# Patient Record
Sex: FEMALE | Race: WHITE | HISPANIC OR LATINO | URBAN - METROPOLITAN AREA
[De-identification: names, ages, dates, MRNs, and addresses within clinical notes are randomized per-mention and may not be internally consistent; named-entity substitution may affect disease eponyms.]

---
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
## 2021-02-21 ENCOUNTER — TRANSCRIPTION ENCOUNTER (OUTPATIENT)
Age: 21
End: 2021-02-21

## 2022-01-14 ENCOUNTER — APPOINTMENT (OUTPATIENT)
Dept: OBGYN | Facility: CLINIC | Age: 22
End: 2022-01-14
Payer: COMMERCIAL

## 2022-01-14 VITALS
HEIGHT: 63 IN | DIASTOLIC BLOOD PRESSURE: 70 MMHG | WEIGHT: 138 LBS | BODY MASS INDEX: 24.45 KG/M2 | SYSTOLIC BLOOD PRESSURE: 104 MMHG

## 2022-01-14 DIAGNOSIS — Z82.69 FAMILY HISTORY OF OTHER DISEASES OF THE MUSCULOSKELETAL SYSTEM AND CONNECTIVE TISSUE: ICD-10-CM

## 2022-01-14 DIAGNOSIS — Z80.0 FAMILY HISTORY OF MALIGNANT NEOPLASM OF DIGESTIVE ORGANS: ICD-10-CM

## 2022-01-14 DIAGNOSIS — N92.6 IRREGULAR MENSTRUATION, UNSPECIFIED: ICD-10-CM

## 2022-01-14 DIAGNOSIS — Z01.419 ENCOUNTER FOR GYNECOLOGICAL EXAMINATION (GENERAL) (ROUTINE) W/OUT ABNORMAL FINDINGS: ICD-10-CM

## 2022-01-14 DIAGNOSIS — Z78.9 OTHER SPECIFIED HEALTH STATUS: ICD-10-CM

## 2022-01-14 LAB
BASOPHILS # BLD AUTO: 0.06 K/UL
BASOPHILS NFR BLD AUTO: 0.9 %
EOSINOPHIL # BLD AUTO: 0.09 K/UL
EOSINOPHIL NFR BLD AUTO: 1.4 %
HCG UR QL: NEGATIVE
HCT VFR BLD CALC: 40.5 %
HGB BLD-MCNC: 13.2 G/DL
IMM GRANULOCYTES NFR BLD AUTO: 0.3 %
LYMPHOCYTES # BLD AUTO: 2.13 K/UL
LYMPHOCYTES NFR BLD AUTO: 33.3 %
MAN DIFF?: NORMAL
MCHC RBC-ENTMCNC: 29.9 PG
MCHC RBC-ENTMCNC: 32.6 GM/DL
MCV RBC AUTO: 91.8 FL
MONOCYTES # BLD AUTO: 0.5 K/UL
MONOCYTES NFR BLD AUTO: 7.8 %
NEUTROPHILS # BLD AUTO: 3.6 K/UL
NEUTROPHILS NFR BLD AUTO: 56.3 %
PLATELET # BLD AUTO: 314 K/UL
RBC # BLD: 4.41 M/UL
RBC # FLD: 12.9 %
WBC # FLD AUTO: 6.4 K/UL

## 2022-01-14 PROCEDURE — 81025 URINE PREGNANCY TEST: CPT

## 2022-01-14 PROCEDURE — 99395 PREV VISIT EST AGE 18-39: CPT

## 2022-01-14 NOTE — PHYSICAL EXAM

## 2022-01-14 NOTE — HISTORY OF PRESENT ILLNESS
[Previously active] : previously active [Condoms] : Condoms [PGHxTotal] : 0 [FreeTextEntry3] : 2 PARTNERS OVER LIFETIME

## 2022-01-19 LAB
C TRACH RRNA SPEC QL NAA+PROBE: NOT DETECTED
ESTIMATED AVERAGE GLUCOSE: 103 MG/DL
FERRITIN SERPL-MCNC: 16 NG/ML
HBA1C MFR BLD HPLC: 5.2 %
HIV1+2 AB SPEC QL IA.RAPID: NONREACTIVE
N GONORRHOEA RRNA SPEC QL NAA+PROBE: NOT DETECTED
SOURCE AMPLIFICATION: NORMAL
T PALLIDUM AB SER QL IA: NEGATIVE
TESTOST FREE SERPL-MCNC: 2 PG/ML
TESTOST SERPL-MCNC: 37.5 NG/DL
TSH SERPL-ACNC: 1.27 UIU/ML

## 2022-01-24 ENCOUNTER — TRANSCRIPTION ENCOUNTER (OUTPATIENT)
Age: 22
End: 2022-01-24

## 2022-01-24 ENCOUNTER — APPOINTMENT (OUTPATIENT)
Age: 22
End: 2022-01-24
Payer: COMMERCIAL

## 2022-01-24 ENCOUNTER — INPATIENT (INPATIENT)
Facility: HOSPITAL | Age: 22
LOS: 1 days | Discharge: HOME | DRG: 494 | End: 2022-01-26
Attending: ORTHOPAEDIC SURGERY | Admitting: ORTHOPAEDIC SURGERY
Payer: COMMERCIAL

## 2022-01-24 ENCOUNTER — NON-APPOINTMENT (OUTPATIENT)
Age: 22
End: 2022-01-24

## 2022-01-24 VITALS — HEIGHT: 63 IN | WEIGHT: 132.06 LBS

## 2022-01-24 DIAGNOSIS — S82.899A OTHER FRACTURE OF UNSPECIFIED LOWER LEG, INITIAL ENCOUNTER FOR CLOSED FRACTURE: ICD-10-CM

## 2022-01-24 LAB
ALBUMIN SERPL ELPH-MCNC: 3.7 G/DL — SIGNIFICANT CHANGE UP (ref 3.3–5)
ALP SERPL-CCNC: 83 U/L — SIGNIFICANT CHANGE UP (ref 40–120)
ALT FLD-CCNC: 27 U/L — SIGNIFICANT CHANGE UP (ref 12–78)
ANION GAP SERPL CALC-SCNC: 4 MMOL/L — LOW (ref 5–17)
APTT BLD: 29.9 SEC — SIGNIFICANT CHANGE UP (ref 27.5–35.5)
AST SERPL-CCNC: 38 U/L — HIGH (ref 15–37)
BASOPHILS # BLD AUTO: 0.07 K/UL — SIGNIFICANT CHANGE UP (ref 0–0.2)
BASOPHILS NFR BLD AUTO: 0.9 % — SIGNIFICANT CHANGE UP (ref 0–2)
BILIRUB SERPL-MCNC: 0.3 MG/DL — SIGNIFICANT CHANGE UP (ref 0.2–1.2)
BUN SERPL-MCNC: 9 MG/DL — SIGNIFICANT CHANGE UP (ref 7–23)
CALCIUM SERPL-MCNC: 9.4 MG/DL — SIGNIFICANT CHANGE UP (ref 8.5–10.1)
CHLORIDE SERPL-SCNC: 108 MMOL/L — SIGNIFICANT CHANGE UP (ref 96–108)
CO2 SERPL-SCNC: 29 MMOL/L — SIGNIFICANT CHANGE UP (ref 22–31)
CREAT SERPL-MCNC: 0.69 MG/DL — SIGNIFICANT CHANGE UP (ref 0.5–1.3)
EOSINOPHIL # BLD AUTO: 0.12 K/UL — SIGNIFICANT CHANGE UP (ref 0–0.5)
EOSINOPHIL NFR BLD AUTO: 1.6 % — SIGNIFICANT CHANGE UP (ref 0–6)
GLUCOSE SERPL-MCNC: 88 MG/DL — SIGNIFICANT CHANGE UP (ref 70–99)
HCG SERPL-ACNC: <1 MIU/ML — SIGNIFICANT CHANGE UP
HCT VFR BLD CALC: 39.3 % — SIGNIFICANT CHANGE UP (ref 34.5–45)
HGB BLD-MCNC: 12.7 G/DL — SIGNIFICANT CHANGE UP (ref 11.5–15.5)
IMM GRANULOCYTES NFR BLD AUTO: 0.3 % — SIGNIFICANT CHANGE UP (ref 0–1.5)
INR BLD: 1 RATIO — SIGNIFICANT CHANGE UP (ref 0.88–1.16)
LYMPHOCYTES # BLD AUTO: 2.38 K/UL — SIGNIFICANT CHANGE UP (ref 1–3.3)
LYMPHOCYTES # BLD AUTO: 31.2 % — SIGNIFICANT CHANGE UP (ref 13–44)
MCHC RBC-ENTMCNC: 30 PG — SIGNIFICANT CHANGE UP (ref 27–34)
MCHC RBC-ENTMCNC: 32.3 GM/DL — SIGNIFICANT CHANGE UP (ref 32–36)
MCV RBC AUTO: 92.7 FL — SIGNIFICANT CHANGE UP (ref 80–100)
MONOCYTES # BLD AUTO: 0.67 K/UL — SIGNIFICANT CHANGE UP (ref 0–0.9)
MONOCYTES NFR BLD AUTO: 8.8 % — SIGNIFICANT CHANGE UP (ref 2–14)
NEUTROPHILS # BLD AUTO: 4.37 K/UL — SIGNIFICANT CHANGE UP (ref 1.8–7.4)
NEUTROPHILS NFR BLD AUTO: 57.2 % — SIGNIFICANT CHANGE UP (ref 43–77)
PLATELET # BLD AUTO: 261 K/UL — SIGNIFICANT CHANGE UP (ref 150–400)
POTASSIUM SERPL-MCNC: 3.6 MMOL/L — SIGNIFICANT CHANGE UP (ref 3.5–5.3)
POTASSIUM SERPL-SCNC: 3.6 MMOL/L — SIGNIFICANT CHANGE UP (ref 3.5–5.3)
PROT SERPL-MCNC: 8.4 GM/DL — HIGH (ref 6–8.3)
PROTHROM AB SERPL-ACNC: 11.6 SEC — SIGNIFICANT CHANGE UP (ref 10.6–13.6)
RBC # BLD: 4.24 M/UL — SIGNIFICANT CHANGE UP (ref 3.8–5.2)
RBC # FLD: 12.6 % — SIGNIFICANT CHANGE UP (ref 10.3–14.5)
SARS-COV-2 RNA SPEC QL NAA+PROBE: SIGNIFICANT CHANGE UP
SODIUM SERPL-SCNC: 141 MMOL/L — SIGNIFICANT CHANGE UP (ref 135–145)
WBC # BLD: 7.63 K/UL — SIGNIFICANT CHANGE UP (ref 3.8–10.5)
WBC # FLD AUTO: 7.63 K/UL — SIGNIFICANT CHANGE UP (ref 3.8–10.5)

## 2022-01-24 PROCEDURE — 73610 X-RAY EXAM OF ANKLE: CPT | Mod: RT

## 2022-01-24 PROCEDURE — 76376 3D RENDER W/INTRP POSTPROCES: CPT | Mod: 26

## 2022-01-24 PROCEDURE — 80048 BASIC METABOLIC PNL TOTAL CA: CPT

## 2022-01-24 PROCEDURE — 73610 X-RAY EXAM OF ANKLE: CPT | Mod: 26,RT

## 2022-01-24 PROCEDURE — 73590 X-RAY EXAM OF LOWER LEG: CPT | Mod: 26,RT

## 2022-01-24 PROCEDURE — 76000 FLUOROSCOPY <1 HR PHYS/QHP: CPT

## 2022-01-24 PROCEDURE — 85027 COMPLETE CBC AUTOMATED: CPT

## 2022-01-24 PROCEDURE — 85610 PROTHROMBIN TIME: CPT

## 2022-01-24 PROCEDURE — 86900 BLOOD TYPING SEROLOGIC ABO: CPT

## 2022-01-24 PROCEDURE — 76376 3D RENDER W/INTRP POSTPROCES: CPT

## 2022-01-24 PROCEDURE — 97116 GAIT TRAINING THERAPY: CPT | Mod: GP

## 2022-01-24 PROCEDURE — 36415 COLL VENOUS BLD VENIPUNCTURE: CPT

## 2022-01-24 PROCEDURE — 87635 SARS-COV-2 COVID-19 AMP PRB: CPT

## 2022-01-24 PROCEDURE — 99222 1ST HOSP IP/OBS MODERATE 55: CPT

## 2022-01-24 PROCEDURE — 97162 PT EVAL MOD COMPLEX 30 MIN: CPT | Mod: GP

## 2022-01-24 PROCEDURE — 73700 CT LOWER EXTREMITY W/O DYE: CPT | Mod: RT

## 2022-01-24 PROCEDURE — 85730 THROMBOPLASTIN TIME PARTIAL: CPT

## 2022-01-24 PROCEDURE — 73590 X-RAY EXAM OF LOWER LEG: CPT | Mod: RT

## 2022-01-24 PROCEDURE — 86901 BLOOD TYPING SEROLOGIC RH(D): CPT

## 2022-01-24 PROCEDURE — 80053 COMPREHEN METABOLIC PANEL: CPT

## 2022-01-24 PROCEDURE — C1713: CPT

## 2022-01-24 PROCEDURE — 81025 URINE PREGNANCY TEST: CPT

## 2022-01-24 PROCEDURE — 86850 RBC ANTIBODY SCREEN: CPT

## 2022-01-24 PROCEDURE — 99204 OFFICE O/P NEW MOD 45 MIN: CPT

## 2022-01-24 PROCEDURE — 73700 CT LOWER EXTREMITY W/O DYE: CPT | Mod: 26,RT

## 2022-01-24 PROCEDURE — 99285 EMERGENCY DEPT VISIT HI MDM: CPT

## 2022-01-24 PROCEDURE — 84702 CHORIONIC GONADOTROPIN TEST: CPT

## 2022-01-24 PROCEDURE — 85025 COMPLETE CBC W/AUTO DIFF WBC: CPT

## 2022-01-24 RX ORDER — SODIUM CHLORIDE 9 MG/ML
1000 INJECTION, SOLUTION INTRAVENOUS
Refills: 0 | Status: DISCONTINUED | OUTPATIENT
Start: 2022-01-24 | End: 2022-01-25

## 2022-01-24 RX ORDER — OXYCODONE HYDROCHLORIDE 5 MG/1
5 TABLET ORAL EVERY 4 HOURS
Refills: 0 | Status: DISCONTINUED | OUTPATIENT
Start: 2022-01-24 | End: 2022-01-25

## 2022-01-24 RX ORDER — HEPARIN SODIUM 5000 [USP'U]/ML
5000 INJECTION INTRAVENOUS; SUBCUTANEOUS ONCE
Refills: 0 | Status: COMPLETED | OUTPATIENT
Start: 2022-01-24 | End: 2022-01-24

## 2022-01-24 RX ORDER — ACETAMINOPHEN 500 MG
975 TABLET ORAL EVERY 6 HOURS
Refills: 0 | Status: DISCONTINUED | OUTPATIENT
Start: 2022-01-24 | End: 2022-01-25

## 2022-01-24 RX ORDER — TRAMADOL HYDROCHLORIDE 50 MG/1
50 TABLET ORAL EVERY 4 HOURS
Refills: 0 | Status: DISCONTINUED | OUTPATIENT
Start: 2022-01-24 | End: 2022-01-24

## 2022-01-24 RX ORDER — TRAMADOL HYDROCHLORIDE 50 MG/1
50 TABLET ORAL EVERY 6 HOURS
Refills: 0 | Status: DISCONTINUED | OUTPATIENT
Start: 2022-01-24 | End: 2022-01-25

## 2022-01-24 RX ORDER — ONDANSETRON 8 MG/1
4 TABLET, FILM COATED ORAL EVERY 6 HOURS
Refills: 0 | Status: DISCONTINUED | OUTPATIENT
Start: 2022-01-24 | End: 2022-01-25

## 2022-01-24 RX ADMIN — SODIUM CHLORIDE 75 MILLILITER(S): 9 INJECTION, SOLUTION INTRAVENOUS at 23:51

## 2022-01-24 RX ADMIN — Medication 975 MILLIGRAM(S): at 18:56

## 2022-01-24 RX ADMIN — HEPARIN SODIUM 5000 UNIT(S): 5000 INJECTION INTRAVENOUS; SUBCUTANEOUS at 18:56

## 2022-01-24 NOTE — ED PROVIDER NOTE - OBJECTIVE STATEMENT
21 year old female with no pertinent PMH presents with right ankle pain s/p slip and fall 2 days ago. Pt seen by Dr. San and found to have multiple ankle fractures, sent in for admission for operative repair. Denies any fevers, chest pain, shortness of breath, abdominal pain, vomiting, diarrhea, headache, vision change, numbness, weakness, or rash. Denies any additional complaints.

## 2022-01-24 NOTE — ED PROVIDER NOTE - PHYSICAL EXAMINATION
TO BE COMPLETED CONSTITUTIONAL: non-toxic, well appearing  SKIN: no rash, no petechiae.  EYES: pink conjunctiva, anicteric  NECK: Supple; no meningismus, no JVD  CARD: RRR, no murmurs, equal radial pulses bilaterally 2+  RESP: CTAB, no respiratory distress  ABD: Soft, non-tender, non-distended, no peritoneal signs  EXT: Normal ROM x4. No edema. RLE splint in place, FROM of toes, warm, dry, well perfused.   NEURO: Alert, oriented. Neuro exam nonfocal.  PSYCH: Cooperative, appropriate.

## 2022-01-24 NOTE — PHYSICAL EXAM
[de-identified] : Right ankle Physical Examination:\par \par General: Alert and oriented x3.  In no acute distress.  Pleasant in nature with a normal affect.  No apparent respiratory distress. \par Erythema, Warmth, Rubor: Negative\par Swelling: Positive\par \par Range of motion and special tests were not performed today in the ankle due to the trimalleolar fracture.  Skin is intact with no blistering.\par \par Pulses: 2+ DP/PT Pulses\par \par Neuro: Intact motor and sensory [de-identified] : 3V of the right ankle were ordered, obtained, and reviewed by me today, 01/24/2022, revealed: Trimalleolar fracture. Subluxation noted. \par

## 2022-01-24 NOTE — ED ADULT NURSE NOTE - CHIEF COMPLAINT QUOTE
Pt presents to ED for right ankle sx with Dr. San. Pt reports ankle fx, confirmed on xray on saturday.   [facility]- [phone]

## 2022-01-24 NOTE — PHARMACOTHERAPY INTERVENTION NOTE - COMMENTS
Medication reconciliation completed.  Reviewed Medication list and confirmed med allergies with patient; confirmed with Dr. First Medfer.

## 2022-01-24 NOTE — ED ADULT TRIAGE NOTE - CHIEF COMPLAINT QUOTE
Pt presents to ED for right ankle sx with Dr. San. Pt reports ankle fx, confirmed on xray on saturday.   [facility]- [phone]

## 2022-01-24 NOTE — H&P ADULT - NSHPPHYSICALEXAM_GEN_ALL_CORE
Exam:  NAD AAOx3 Well nourished  Head: NC AT, PEERL  Neck: FAROM supple  Pulse: Regular  Lungs: Breathing nonlabored  Abdomen: Soft NT  LE: No edema  Musculoskeletal:  right ankle in splint  able to wiggle toes Exam:  NAD AAOx3 Well nourished  Head: NC AT, PEERL  Neck: FAROM supple  Pulse: Regular  Lungs: Breathing nonlabored  Abdomen: Soft NT  LE: No edema  Musculoskeletal:  right ankle in trilam plaster splint  able to wiggle toes  We took down part of padding to assess skin. SKin noted to be swollen but no blistering or ecchymosis  Toes with good sensation distally and warm

## 2022-01-24 NOTE — HISTORY OF PRESENT ILLNESS
[FreeTextEntry1] : 1/24/2022: The patient is a 21-year-old female who presents her mom today in the office for evaluation of her right ankle fracture.  This past Friday, she slipped and fell on ice accidentally, fracturing and dislocating the ankle. She then went to the hospital after the injury where they took x-rays and placed her in a splint, she is nonweightbearing with crutches today. As per the mother, she states that the patient was splinted two times while in the ED. She denies fevers, chills, night sweats, shortness of breath.  Her pain scale in the ankle today is a 9 out of 10. She is currently on pain medications as prescribed by the Hospital as needed. She is not currently on Birth Control. Of note, the patient is a current student at [employer]. She has no other complaints.

## 2022-01-24 NOTE — PATIENT PROFILE ADULT - FALL HARM RISK - HARM RISK INTERVENTIONS

## 2022-01-24 NOTE — H&P ADULT - HISTORY OF PRESENT ILLNESS
pt w ankle fx sent in by DR San   pt w ankle fx sent in by DR San for surgery. She had slipped on Ice on Saturday and was reduced in ED at [facility] near her [employer] which she says took 3 attepmts to do. She followed up with DR San in the office today and was sent in for plan for surgery tomorrow. She has no other complaints and there was no other injury. She is accompanied by her mom today in the ED.   Healthy 21F pt w ankle fx sent in by DR San for surgery. She had slipped on Ice on Saturday and was reduced in ED at [facility] near her [employer] which she says took 3 attepmts to do. She followed up with DR San in the office today and was sent in for plan for surgery tomorrow. She has no other complaints and there was no other injury. She is accompanied by her mom today in the ED. SHe takes no medications. Has been elevating her ankle at home since and has not walked on it.

## 2022-01-24 NOTE — DISCUSSION/SUMMARY
[de-identified] : Today I had a lengthy discussion with the patient regarding their right ankle, Trimalleolar fracture. I have addressed all the patient's concerns surrounding the pathology of their condition. XR imaging was completed in office today and results were reviewed with the patient. A lengthy discussion was had about the surgery, ORIF of the right ankle. All risks, benefits and alternatives to the recommended surgical procedure were discussed which include but are not limited to bleeding, infection, nerve damage, vascular damage, failure of the wound to heal, the need for further surgery, loss of limb, DVT, PE, loss of life as well as the risks associated with general anesthesia. The patient will need medical clearance prior to surgery, including but not limited to a COVID-19 PCR Test. The patient verbalized understanding and provided informed consent to move forward with surgery.		\par \par In the interim, the patient will remain nonweightbearing in the protective splint applied in the ED. I recommended that the patient and her mother travel to [place] ED today for possible admittance for surgery tomorrow and for further manipulation in the splint. We discussed the utilization of a blood thinner while in the ED for DVT Prophylaxis. A prescription for Aspirin was provided for the patient in the office today. She will continue to utilize the crutches for ambulation assistance PRN. The patient understood and verbally agreed to the treatment plan. All of their questions were answered and they were satisfied with the visit. The patient should call the office if they have any questions or experience worsening symptoms.\par \par The patient is a current student at [employer]. We had a discussion with regards to class excusal and for accommodations with regards to the patient's course schedule and living situations.

## 2022-01-24 NOTE — H&P ADULT - ASSESSMENT
Admit w plan for ORIF tomorrow 1/25 w DR Campo  xrays  Npo after midnight   DVT PE ppx  NWB  Elevation Admit w plan for ORIF tomorrow 1/25 w DR San  Ankle was elevated using blankets. Needs to maintain 3 pillow elevation at all times  Large Ice bag was draped over ankle was well  xrays done  Npo after midnight   DVT PE ppx Heparin SC  NWB  Pt signed consent for ORIF as well as possible Ex-fix if too swollen that was discussed w Mom at the bedside.  Above as discussed/directed by Dr San

## 2022-01-24 NOTE — ED PROVIDER NOTE - NS ED ROS FT
Review of Systems    Constitutional: (-) fever, (-) chills, (-) fatigue  HEENT: (-) sore throat, (-) hearing loss, (-) nasal congestion  Cardiovascular: (-) chest pain, (-) syncope  Respiratory: (-) cough, (-) shortness of breath  Gastrointestinal: (-) vomiting, (-) diarrhea, (-) abdominal pain  Musculoskeletal: (-) neck pain, (-) back pain, (+) ankle pain  Integumentary: (-) rash, (-) edema, (-) wound  Neurological: (-) headache, (-) altered mental status    Except as documented in the HPI, all other systems are negative.

## 2022-01-24 NOTE — H&P ADULT - ATTENDING COMMENTS
Pt seen and examined. Chart reviewed. The patient was sent in to the ED after seeing me in the office for definitive surgical management. The patient had a fall with a right ankle trimalleolar fracture with concern for soft tissue compromise and subluxated position in the splint after 3 reduction attempts at the outside facility. Surgical indications met. All RBA discussed at length. Postop recovery plan reviewed with the patient. Agree to move forward with the surgical plan.

## 2022-01-24 NOTE — ADDENDUM
[FreeTextEntry1] : I, Arabella Lay, acted solely as a scribe for Dr. Naseem San on this date 01/24/2022.\par \par All medical record entries made by the Scribe were at my, Dr. Naseem San, direction and personally dictated by me on 01/24/2022 . I have reviewed the chart and agree that the record accurately reflects my personal performance of the history, physical exam, assessment and plan. I have also personally directed, reviewed, and agreed with the chart.	\par

## 2022-01-25 LAB
ANION GAP SERPL CALC-SCNC: 4 MMOL/L — LOW (ref 5–17)
ANION GAP SERPL CALC-SCNC: 6 MMOL/L — SIGNIFICANT CHANGE UP (ref 5–17)
APTT BLD: 29.2 SEC — SIGNIFICANT CHANGE UP (ref 27.5–35.5)
BUN SERPL-MCNC: 7 MG/DL — SIGNIFICANT CHANGE UP (ref 7–23)
BUN SERPL-MCNC: 7 MG/DL — SIGNIFICANT CHANGE UP (ref 7–23)
CALCIUM SERPL-MCNC: 9 MG/DL — SIGNIFICANT CHANGE UP (ref 8.5–10.1)
CALCIUM SERPL-MCNC: 9.1 MG/DL — SIGNIFICANT CHANGE UP (ref 8.5–10.1)
CHLORIDE SERPL-SCNC: 104 MMOL/L — SIGNIFICANT CHANGE UP (ref 96–108)
CHLORIDE SERPL-SCNC: 108 MMOL/L — SIGNIFICANT CHANGE UP (ref 96–108)
CO2 SERPL-SCNC: 28 MMOL/L — SIGNIFICANT CHANGE UP (ref 22–31)
CO2 SERPL-SCNC: 30 MMOL/L — SIGNIFICANT CHANGE UP (ref 22–31)
CREAT SERPL-MCNC: 0.64 MG/DL — SIGNIFICANT CHANGE UP (ref 0.5–1.3)
CREAT SERPL-MCNC: 0.68 MG/DL — SIGNIFICANT CHANGE UP (ref 0.5–1.3)
GLUCOSE SERPL-MCNC: 113 MG/DL — HIGH (ref 70–99)
GLUCOSE SERPL-MCNC: 85 MG/DL — SIGNIFICANT CHANGE UP (ref 70–99)
HCG UR QL: NEGATIVE — SIGNIFICANT CHANGE UP
HCT VFR BLD CALC: 35 % — SIGNIFICANT CHANGE UP (ref 34.5–45)
HCT VFR BLD CALC: 35.3 % — SIGNIFICANT CHANGE UP (ref 34.5–45)
HGB BLD-MCNC: 11.3 G/DL — LOW (ref 11.5–15.5)
HGB BLD-MCNC: 11.6 G/DL — SIGNIFICANT CHANGE UP (ref 11.5–15.5)
INR BLD: 1.01 RATIO — SIGNIFICANT CHANGE UP (ref 0.88–1.16)
MCHC RBC-ENTMCNC: 29.9 PG — SIGNIFICANT CHANGE UP (ref 27–34)
MCHC RBC-ENTMCNC: 30.4 PG — SIGNIFICANT CHANGE UP (ref 27–34)
MCHC RBC-ENTMCNC: 32.3 GM/DL — SIGNIFICANT CHANGE UP (ref 32–36)
MCHC RBC-ENTMCNC: 32.9 GM/DL — SIGNIFICANT CHANGE UP (ref 32–36)
MCV RBC AUTO: 92.6 FL — SIGNIFICANT CHANGE UP (ref 80–100)
MCV RBC AUTO: 92.7 FL — SIGNIFICANT CHANGE UP (ref 80–100)
PLATELET # BLD AUTO: 227 K/UL — SIGNIFICANT CHANGE UP (ref 150–400)
PLATELET # BLD AUTO: 247 K/UL — SIGNIFICANT CHANGE UP (ref 150–400)
POTASSIUM SERPL-MCNC: 3.8 MMOL/L — SIGNIFICANT CHANGE UP (ref 3.5–5.3)
POTASSIUM SERPL-MCNC: 3.9 MMOL/L — SIGNIFICANT CHANGE UP (ref 3.5–5.3)
POTASSIUM SERPL-SCNC: 3.8 MMOL/L — SIGNIFICANT CHANGE UP (ref 3.5–5.3)
POTASSIUM SERPL-SCNC: 3.9 MMOL/L — SIGNIFICANT CHANGE UP (ref 3.5–5.3)
PROTHROM AB SERPL-ACNC: 11.8 SEC — SIGNIFICANT CHANGE UP (ref 10.6–13.6)
RBC # BLD: 3.78 M/UL — LOW (ref 3.8–5.2)
RBC # BLD: 3.81 M/UL — SIGNIFICANT CHANGE UP (ref 3.8–5.2)
RBC # FLD: 12.6 % — SIGNIFICANT CHANGE UP (ref 10.3–14.5)
RBC # FLD: 12.7 % — SIGNIFICANT CHANGE UP (ref 10.3–14.5)
SODIUM SERPL-SCNC: 138 MMOL/L — SIGNIFICANT CHANGE UP (ref 135–145)
SODIUM SERPL-SCNC: 142 MMOL/L — SIGNIFICANT CHANGE UP (ref 135–145)
WBC # BLD: 5.45 K/UL — SIGNIFICANT CHANGE UP (ref 3.8–10.5)
WBC # BLD: 8.49 K/UL — SIGNIFICANT CHANGE UP (ref 3.8–10.5)
WBC # FLD AUTO: 5.45 K/UL — SIGNIFICANT CHANGE UP (ref 3.8–10.5)
WBC # FLD AUTO: 8.49 K/UL — SIGNIFICANT CHANGE UP (ref 3.8–10.5)

## 2022-01-25 PROCEDURE — 77071 MNL APPL STRS JT RADIOGRAPHY: CPT | Mod: RT

## 2022-01-25 PROCEDURE — 99221 1ST HOSP IP/OBS SF/LOW 40: CPT

## 2022-01-25 PROCEDURE — 27822 TREATMENT OF ANKLE FRACTURE: CPT | Mod: RT

## 2022-01-25 RX ORDER — OXYCODONE HYDROCHLORIDE 5 MG/1
5 TABLET ORAL EVERY 4 HOURS
Refills: 0 | Status: DISCONTINUED | OUTPATIENT
Start: 2022-01-25 | End: 2022-01-25

## 2022-01-25 RX ORDER — HEPARIN SODIUM 5000 [USP'U]/ML
5000 INJECTION INTRAVENOUS; SUBCUTANEOUS EVERY 12 HOURS
Refills: 0 | Status: DISCONTINUED | OUTPATIENT
Start: 2022-01-25 | End: 2022-01-25

## 2022-01-25 RX ORDER — CEFAZOLIN SODIUM 1 G
2000 VIAL (EA) INJECTION EVERY 8 HOURS
Refills: 0 | Status: COMPLETED | OUTPATIENT
Start: 2022-01-25 | End: 2022-01-26

## 2022-01-25 RX ORDER — HEPARIN SODIUM 5000 [USP'U]/ML
5000 INJECTION INTRAVENOUS; SUBCUTANEOUS ONCE
Refills: 0 | Status: COMPLETED | OUTPATIENT
Start: 2022-01-25 | End: 2022-01-25

## 2022-01-25 RX ORDER — FENTANYL CITRATE 50 UG/ML
50 INJECTION INTRAVENOUS
Refills: 0 | Status: DISCONTINUED | OUTPATIENT
Start: 2022-01-25 | End: 2022-01-25

## 2022-01-25 RX ORDER — OXYCODONE HYDROCHLORIDE 5 MG/1
10 TABLET ORAL
Refills: 0 | Status: DISCONTINUED | OUTPATIENT
Start: 2022-01-25 | End: 2022-01-26

## 2022-01-25 RX ORDER — ONDANSETRON 8 MG/1
4 TABLET, FILM COATED ORAL EVERY 6 HOURS
Refills: 0 | Status: DISCONTINUED | OUTPATIENT
Start: 2022-01-25 | End: 2022-01-26

## 2022-01-25 RX ORDER — ACETAMINOPHEN 500 MG
975 TABLET ORAL EVERY 8 HOURS
Refills: 0 | Status: DISCONTINUED | OUTPATIENT
Start: 2022-01-25 | End: 2022-01-26

## 2022-01-25 RX ORDER — POLYETHYLENE GLYCOL 3350 17 G/17G
17 POWDER, FOR SOLUTION ORAL AT BEDTIME
Refills: 0 | Status: DISCONTINUED | OUTPATIENT
Start: 2022-01-25 | End: 2022-01-26

## 2022-01-25 RX ORDER — SENNA PLUS 8.6 MG/1
2 TABLET ORAL AT BEDTIME
Refills: 0 | Status: DISCONTINUED | OUTPATIENT
Start: 2022-01-25 | End: 2022-01-26

## 2022-01-25 RX ORDER — ASCORBIC ACID 60 MG
500 TABLET,CHEWABLE ORAL
Refills: 0 | Status: DISCONTINUED | OUTPATIENT
Start: 2022-01-25 | End: 2022-01-26

## 2022-01-25 RX ORDER — OXYCODONE HYDROCHLORIDE 5 MG/1
10 TABLET ORAL EVERY 4 HOURS
Refills: 0 | Status: DISCONTINUED | OUTPATIENT
Start: 2022-01-25 | End: 2022-01-25

## 2022-01-25 RX ORDER — ACETAMINOPHEN 500 MG
975 TABLET ORAL EVERY 6 HOURS
Refills: 0 | Status: DISCONTINUED | OUTPATIENT
Start: 2022-01-25 | End: 2022-01-25

## 2022-01-25 RX ORDER — SODIUM CHLORIDE 9 MG/ML
1000 INJECTION, SOLUTION INTRAVENOUS
Refills: 0 | Status: DISCONTINUED | OUTPATIENT
Start: 2022-01-25 | End: 2022-01-26

## 2022-01-25 RX ORDER — PANTOPRAZOLE SODIUM 20 MG/1
40 TABLET, DELAYED RELEASE ORAL
Refills: 0 | Status: DISCONTINUED | OUTPATIENT
Start: 2022-01-25 | End: 2022-01-26

## 2022-01-25 RX ORDER — SODIUM CHLORIDE 9 MG/ML
1000 INJECTION, SOLUTION INTRAVENOUS
Refills: 0 | Status: DISCONTINUED | OUTPATIENT
Start: 2022-01-25 | End: 2022-01-25

## 2022-01-25 RX ORDER — TRAMADOL HYDROCHLORIDE 50 MG/1
50 TABLET ORAL EVERY 6 HOURS
Refills: 0 | Status: DISCONTINUED | OUTPATIENT
Start: 2022-01-25 | End: 2022-01-26

## 2022-01-25 RX ORDER — FOLIC ACID 0.8 MG
1 TABLET ORAL DAILY
Refills: 0 | Status: DISCONTINUED | OUTPATIENT
Start: 2022-01-25 | End: 2022-01-26

## 2022-01-25 RX ORDER — MAGNESIUM HYDROXIDE 400 MG/1
30 TABLET, CHEWABLE ORAL DAILY
Refills: 0 | Status: DISCONTINUED | OUTPATIENT
Start: 2022-01-25 | End: 2022-01-26

## 2022-01-25 RX ORDER — HYDROMORPHONE HYDROCHLORIDE 2 MG/ML
0.5 INJECTION INTRAMUSCULAR; INTRAVENOUS; SUBCUTANEOUS ONCE
Refills: 0 | Status: DISCONTINUED | OUTPATIENT
Start: 2022-01-25 | End: 2022-01-25

## 2022-01-25 RX ORDER — ACETAMINOPHEN 500 MG
1000 TABLET ORAL ONCE
Refills: 0 | Status: DISCONTINUED | OUTPATIENT
Start: 2022-01-25 | End: 2022-01-26

## 2022-01-25 RX ORDER — OXYCODONE HYDROCHLORIDE 5 MG/1
5 TABLET ORAL
Refills: 0 | Status: DISCONTINUED | OUTPATIENT
Start: 2022-01-25 | End: 2022-01-26

## 2022-01-25 RX ORDER — ONDANSETRON 8 MG/1
4 TABLET, FILM COATED ORAL EVERY 6 HOURS
Refills: 0 | Status: DISCONTINUED | OUTPATIENT
Start: 2022-01-25 | End: 2022-01-25

## 2022-01-25 RX ORDER — ASPIRIN/CALCIUM CARB/MAGNESIUM 324 MG
325 TABLET ORAL
Refills: 0 | Status: DISCONTINUED | OUTPATIENT
Start: 2022-01-25 | End: 2022-01-25

## 2022-01-25 RX ORDER — HYDROMORPHONE HYDROCHLORIDE 2 MG/ML
0.5 INJECTION INTRAMUSCULAR; INTRAVENOUS; SUBCUTANEOUS ONCE
Refills: 0 | Status: COMPLETED | OUTPATIENT
Start: 2022-01-25

## 2022-01-25 RX ORDER — MEPERIDINE HYDROCHLORIDE 50 MG/ML
12.5 INJECTION INTRAMUSCULAR; INTRAVENOUS; SUBCUTANEOUS
Refills: 0 | Status: DISCONTINUED | OUTPATIENT
Start: 2022-01-25 | End: 2022-01-25

## 2022-01-25 RX ORDER — ASPIRIN/CALCIUM CARB/MAGNESIUM 324 MG
325 TABLET ORAL DAILY
Refills: 0 | Status: DISCONTINUED | OUTPATIENT
Start: 2022-01-26 | End: 2022-01-26

## 2022-01-25 RX ORDER — ONDANSETRON 8 MG/1
4 TABLET, FILM COATED ORAL ONCE
Refills: 0 | Status: DISCONTINUED | OUTPATIENT
Start: 2022-01-25 | End: 2022-01-25

## 2022-01-25 RX ORDER — CEFAZOLIN SODIUM 1 G
2000 VIAL (EA) INJECTION EVERY 8 HOURS
Refills: 0 | Status: DISCONTINUED | OUTPATIENT
Start: 2022-01-25 | End: 2022-01-25

## 2022-01-25 RX ORDER — OXYCODONE HYDROCHLORIDE 5 MG/1
5 TABLET ORAL ONCE
Refills: 0 | Status: DISCONTINUED | OUTPATIENT
Start: 2022-01-25 | End: 2022-01-25

## 2022-01-25 RX ADMIN — Medication 975 MILLIGRAM(S): at 04:56

## 2022-01-25 RX ADMIN — SENNA PLUS 2 TABLET(S): 8.6 TABLET ORAL at 21:09

## 2022-01-25 RX ADMIN — HYDROMORPHONE HYDROCHLORIDE 0.5 MILLIGRAM(S): 2 INJECTION INTRAMUSCULAR; INTRAVENOUS; SUBCUTANEOUS at 17:15

## 2022-01-25 RX ADMIN — FENTANYL CITRATE 50 MICROGRAM(S): 50 INJECTION INTRAVENOUS at 14:36

## 2022-01-25 RX ADMIN — Medication 975 MILLIGRAM(S): at 05:26

## 2022-01-25 RX ADMIN — Medication 100 MILLIGRAM(S): at 20:28

## 2022-01-25 RX ADMIN — HYDROMORPHONE HYDROCHLORIDE 0.5 MILLIGRAM(S): 2 INJECTION INTRAMUSCULAR; INTRAVENOUS; SUBCUTANEOUS at 17:00

## 2022-01-25 RX ADMIN — FENTANYL CITRATE 50 MICROGRAM(S): 50 INJECTION INTRAVENOUS at 14:55

## 2022-01-25 RX ADMIN — Medication 1 TABLET(S): at 21:08

## 2022-01-25 RX ADMIN — Medication 975 MILLIGRAM(S): at 21:17

## 2022-01-25 RX ADMIN — OXYCODONE HYDROCHLORIDE 5 MILLIGRAM(S): 5 TABLET ORAL at 15:18

## 2022-01-25 RX ADMIN — SODIUM CHLORIDE 75 MILLILITER(S): 9 INJECTION, SOLUTION INTRAVENOUS at 14:37

## 2022-01-25 RX ADMIN — OXYCODONE HYDROCHLORIDE 5 MILLIGRAM(S): 5 TABLET ORAL at 15:11

## 2022-01-25 RX ADMIN — POLYETHYLENE GLYCOL 3350 17 GRAM(S): 17 POWDER, FOR SOLUTION ORAL at 21:09

## 2022-01-25 RX ADMIN — Medication 975 MILLIGRAM(S): at 21:27

## 2022-01-25 RX ADMIN — Medication 500 MILLIGRAM(S): at 21:08

## 2022-01-25 RX ADMIN — HEPARIN SODIUM 5000 UNIT(S): 5000 INJECTION INTRAVENOUS; SUBCUTANEOUS at 21:09

## 2022-01-25 NOTE — PHYSICAL THERAPY INITIAL EVALUATION ADULT - ACTIVE RANGE OF MOTION EXAMINATION, REHAB EVAL
hip/knee WFL,ankle immobilized in splint post-op ,toes mobile/yan. upper extremity Active ROM was WNL (within normal limits)/LLE Active ROM was WNL (within normal limits)/Right LE Active ROM was WFL (within functional limits)/deficits as listed below

## 2022-01-25 NOTE — BRIEF OPERATIVE NOTE - NSICDXBRIEFPROCEDURE_GEN_ALL_CORE_FT
PROCEDURES:  Open reduction of trimalleolar fracture of right ankle 25-Jan-2022 14:15:56  Dorian Manuel

## 2022-01-25 NOTE — PHYSICAL THERAPY INITIAL EVALUATION ADULT - PATIENT/FAMILY/SIGNIFICANT OTHER GOALS STATEMENT, PT EVAL
pt wants to be able to climb stairs eventually using cructhes as she lives in 3rd floor walk-up style home @ [facility] on campus

## 2022-01-25 NOTE — PHYSICAL THERAPY INITIAL EVALUATION ADULT - SKIN INTEGRITY
lower leg/ankle not visualized as immobilized in splint post-op secured with elastic bandaging/surgical incision

## 2022-01-25 NOTE — PHYSICAL THERAPY INITIAL EVALUATION ADULT - LEVEL OF INDEPENDENCE: SCOOT/BRIDGE, REHAB EVAL
Preop diagnosis: cp  Post op diagnosis: cp  Procedures: c lv cors  Findings: major cors normal distal lad very small and tortuous could be prone to spasm ?  Area of bridging also in the mid to distal vessel very small in this area  Med management  EBL: 20 contact guard/stand-by assist

## 2022-01-25 NOTE — PHYSICAL THERAPY INITIAL EVALUATION ADULT - LIVES WITH, PROFILE
attending college in [facility] ,returned home to [facility] for planned surgery attending [employer] in [facility] ,returned home to [facility] for planned surgery

## 2022-01-25 NOTE — PHYSICAL THERAPY INITIAL EVALUATION ADULT - DIAGNOSIS, PT EVAL
acute traumatic ankle fx s/p closed reduction 1/22,s/p ORIF 1/25/22 acute traumatic R ankle fx s/p closed reduction 1/22,s/p ORIF 1/25/22 acute traumatic R ankle trimalleolar fx s/p closed reduction 1/22,s/p ORIF 1/25/22

## 2022-01-25 NOTE — PHYSICAL THERAPY INITIAL EVALUATION ADULT - PERTINENT HX OF CURRENT PROBLEM, REHAB EVAL
slip /fall on ice Saturday 1/22 ,initially treated at [facility] near [facility],closed reduced on 3rd attempt reportedly in ED there,pt has been splinted,NWLISSETTE scanlon and maintaining elevation ,returned home and presents for surgical fixation with Dr San slip /fall on ice Saturday 1/22 ,initially treated at [facility] near [facility], closed reduced on 3rd attempt reportedly in ED there,pt has been splinted,NWLISSETTE scanlon and maintaining elevation ,returned home and presents for surgical fixation with Dr San

## 2022-01-25 NOTE — PHYSICAL THERAPY INITIAL EVALUATION ADULT - REFERRING PHYSICIAN, REHAB EVAL
EZEKIEL Weinstein ( Orthopedic Team) 1/24/22 CW NWLISSETTE ALLENE EZEKIEL Weinstein ( Orthopedic Team) 1/24/22 @ 1812pm JP ALLENE

## 2022-01-25 NOTE — CONSULT NOTE ADULT - ASSESSMENT
Healthy 21F pt w ankle fx sent in by DR San for surgery. She had slipped on Ice on Saturday and was reduced in ED at [facility] near her [employer] which she says took 3 attepmts to do. Patient is now s/p Right Ankle ORIF   Consulted by     for VTE prophylaxis, risk stratification, and anticoagulation management. patient is moderate risk for VTE, low bleeding risk.    plan  Discussed the risks vs benefits of full dose aspirin therapy with patient. Agrees with treatment and understands the necessity of therapy.    Plan:  Heparin 5000units SQ tonight     Start Enteric coated ASA 325mg PO BID from tomorrow  till full weight bearing    Daily CBC/BMP    Venodynes    Enc ambulation,     Thank you for this consult will f/u

## 2022-01-25 NOTE — PHYSICAL THERAPY INITIAL EVALUATION ADULT - MODALITIES TREATMENT COMMENTS
pt educted and performed traditional gait training NWB R with ax.crutches ; made aware of non-traditional devices too aid mobility including kneeling scooter and the IWALK 3.0 hands-free pylon that will allow stair-climbing without crutches

## 2022-01-25 NOTE — CONSULT NOTE ADULT - SUBJECTIVE AND OBJECTIVE BOX
HPI:  Healthy 21F pt w ankle fx sent in by DR San for surgery. She had slipped on Ice on Saturday and was reduced in ED at [facility] [employer] which she says took 3 attepmts to do. She followed up with DR San in the office today and was sent in for plan for surgery tomorrow. She has no other complaints and there was no other injury. She is accompanied by her mom today in the ED. SHe takes no medications. Has been elevating her ankle at home since and has not walked on it.   (2022 17:51)      Patient is a 21y old  Female who presents with a chief complaint of ankle fx (2022 17:51)      Consulted by     for VTE prophylaxis, risk stratification, and anticoagulation management.    PAST MEDICAL & SURGICAL HISTORY:  No pertinent past medical history    No significant past surgical history    Interval History  22:Patient seen at bedside discussed the necessity of anticoagulation, patient denies any h/o VTE or clotting disorders, patient is NWB on her right leg , PT pending will give Heparin for tonight and will switch to ECASA tomorrow         CrCl:132  BMI:23.4  EBL:20ml    Caprini VTE Risk Score:CAPRINI SCORE  AGE RELATED RISK FACTORS                                                       MOBILITY RELATED FACTORS  [ ] Age 41-60 years                                            (1 Point)                  [ ] Bed rest /restricted mobility                             (1 Point)  [ ] Age: 61-74 years                                           (2 Points)                [ ] Plaster cast                                                   (2 Points)  [ ] Age= 75 years                                              (3 Points)                 [ ] Bed bound for more than 72 hours                   (2 Points)    DISEASE RELATED RISK FACTORS                                               GENDER SPECIFIC FACTORS  [ ] Edema in the lower extremities                       (1 Point)           [ ] Pregnancy                                                            (1 Point)  [ ] Varicose veins                                               (1 Point)                  [ ] Post-partum < 6 weeks                                      (1 Point)             [ ] BMI > 25 Kg/m2                                            (1 Point)                  [ ] Hormonal therapy or oral contraception       (1 Point)                 [ ] Sepsis (in the previous month)                        (1 Point)             [ ] History of pregnancy complications                (1Point)  [ ] Pneumonia or serious lung disease                                             [ ] Unexplained or recurrent  (=/>3), premature                                 (In the previous month)                               (1 Point)                birth with toxemia or growth-restricted infant (1 Point)  [ ] Abnormal pulmonary function test            (1 Point)                                   SURGERY RELATED RISK FACTORS  [ ] Acute myocardial infarction                       (1 Point)                  [ ]  Section                                         (1 Point)  [ ] Congestive heart failure (in the previous month) (1 Point)   [ ] Minor surgery   lasting <45 minutes       (1 Point)   [ ] Inflammatory bowel disease                             (1 Point)          [ ] Arthroscopic surgery                                  (2 Points)  [ ] Central venous access                                    (2 Points)            [ ] General surgery lasting >45 minutes      (2 Points)       [ ] Stroke (in the previous month)                  (5 Points)            [ ] Elective major lower extremity arthroplasty (5 Points)                                   [  ] Malignancy (present or past include skin melanoma                                          but exclude  basal skin cell)    (2 points)                                      TRAUMA RELATED RISK FACTORS                HEMATOLOGY RELATED FACTORS                                  [ x] Fracture of the hip, pelvis, or leg                       (5 Points)  [ ] Prior episodes of VTE                                     (3 Points)          [ ] Acute spinal cord injury (in the previous month)  (5 Points)  [ ] Positive family history for VTE                         (3 Points)       [ ] Paralysis (less than 1 month)                          (5 Points)  [ ] Prothrombin 54844 A                                      (3 Points)         [ ] Multiple Trauma (within 1month)                 (5Points)                                                                                                                                                                [ ] Factor V Leiden                                          (3 Points)                                OTHER RISK FACTORS                          [ ] Lupus anticoagulants                                     (3 Points)                       [ ] BMI > 40                          (1 Point)                                                         [ ] Anticardiolipin antibodies                                (3 Points)                   [ ] Smoking                              (1Point)                                                [ ] High homocysteine in the blood                      (3 Points)                [  ] Diabetes requiring insulin (1point)                         [ ] Other congenital or acquired thrombophilia       (3 Points)          [  ] Chemotherapy                   (1 Point)  [ ] Heparin induced thrombocytopenia                  (3 Points)             [  ] Blood Transfusion                (1 point)                                                                                                             Total Score [    5      ]                                                                                                                                                                                                                                                                                                                                                                                                                                         IMPROVE Bleeding Risk Score:      Time In: 12:13  Time Out: 14:07    Falls Risk:   High (  )  Mod (  )  Low (x  )      FAMILY HISTORY:    Denies any personal or familial history of clotting or bleeding disorders.    Allergies    No Known Allergies    Intolerances        REVIEW OF SYSTEMS    (  )Fever	     (  )Constipation	(  )SOB				(  )Headache	(  )Dysuria  (  )Chills	     (  )Melena	(  )Dyspnea present on exertion	                    (  )Dizziness                    (  )Polyuria  (  )Nausea	     (  )Hematochezia	(  )Cough			                    (  )Syncope   	(  )Hematuria  (  )Vomiting    (  )Chest Pain	(  )Wheezing			(  )Weakness  (  )Diarrhea     (  )Palpitations	(  )Anorexia			( x )joint pain    All  other review of systems negative: Yes    Vital Signs Last 24 Hrs  T(C): 36.5 (2022 15:35), Max: 37.2 (2022 10:10)  T(F): 97.7 (2022 15:35), Max: 98.9 (2022 10:10)  HR: 69 (2022 15:35) (58 - 100)  BP: 110/60 (2022 15:35) (105/56 - 112/69)  BP(mean): 68 (2022 10:10) (68 - 82)  RR: 17 (2022 15:35) (12 - 20)  SpO2: 98% (2022 15:35) (97% - 100%)    PHYSICAL EXAM:    Constitutional: Appears Well    Neurological: A& O x 3    Skin: Warm    Respiratory and Thorax: normal effort; Breath sounds: normal; No rales/wheezing/rhonchi  	  Cardiovascular: S1, S2, regular, NMBR	    Gastrointestinal: BS + x 4Q, nontender	    Genitourinary:  Bladder nondistended, nontender    Musculoskeletal:   General Right:   + muscle/joint tenderness,   normal tone, no joint swelling,   ROM: limited	    General Left:   no muscle/joint tenderness,   normal tone, no joint swelling,   ROM: full    LE:  Right: Dressing CDI; Cap refill good; Sensation intact                        Lower extrems:   Right: N/A  Left:   no calf tenderness              negative devaughn's sign               + pedal pulses                          11.3   5.45  )-----------( 227      ( 2022 04:44 )             35.0       01-    142  |  108  |  7   ----------------------------<  85  3.9   |  30  |  0.64    Ca    9.1      2022 04:44    TPro  8.4<H>  /  Alb  3.7  /  TBili  0.3  /  DBili  x   /  AST  38<H>  /  ALT  27  /  AlkPhos  83  01-24      PT/INR - ( 2022 04:44 )   PT: 11.8 sec;   INR: 1.01 ratio         PTT - ( 2022 04:44 )  PTT:29.2 sec				    MEDICATIONS  (STANDING):  acetaminophen     Tablet .. 975 milliGRAM(s) Oral every 8 hours  acetaminophen   IVPB .. 1000 milliGRAM(s) IV Intermittent once  ascorbic acid 500 milliGRAM(s) Oral two times a day  calcium carbonate 1250 mG  + Vitamin D (OsCal 500 + D) 1 Tablet(s) Oral three times a day  ceFAZolin   IVPB 2000 milliGRAM(s) IV Intermittent every 8 hours  ceFAZolin   IVPB 2000 milliGRAM(s) IV Intermittent every 8 hours  folic acid 1 milliGRAM(s) Oral daily  heparin   Injectable 5000 Unit(s) SubCutaneous every 12 hours  HYDROmorphone  Injectable 0.5 milliGRAM(s) IV Push once  lactated ringers. 1000 milliLiter(s) (100 mL/Hr) IV Continuous <Continuous>  multivitamin 1 Tablet(s) Oral daily  pantoprazole    Tablet 40 milliGRAM(s) Oral before breakfast  polyethylene glycol 3350 17 Gram(s) Oral at bedtime  senna 2 Tablet(s) Oral at bedtime        DVT Prophylaxis:  LMWH                   (  )  Heparin SQ           ( x )  Coumadin             (  )  Xarelto                  (  )  Eliquis                   (  )  Venodynes           ( x )  Ambulation          (x  )  UFH                       (  )  Contraindicated  (  )  EC ASPIRIN       (  )

## 2022-01-26 ENCOUNTER — TRANSCRIPTION ENCOUNTER (OUTPATIENT)
Age: 22
End: 2022-01-26

## 2022-01-26 VITALS
DIASTOLIC BLOOD PRESSURE: 52 MMHG | SYSTOLIC BLOOD PRESSURE: 108 MMHG | RESPIRATION RATE: 16 BRPM | OXYGEN SATURATION: 100 % | HEART RATE: 61 BPM | TEMPERATURE: 98 F

## 2022-01-26 LAB
ANION GAP SERPL CALC-SCNC: 6 MMOL/L — SIGNIFICANT CHANGE UP (ref 5–17)
BASOPHILS # BLD AUTO: 0.04 K/UL — SIGNIFICANT CHANGE UP (ref 0–0.2)
BASOPHILS NFR BLD AUTO: 0.3 % — SIGNIFICANT CHANGE UP (ref 0–2)
BUN SERPL-MCNC: 7 MG/DL — SIGNIFICANT CHANGE UP (ref 7–23)
CALCIUM SERPL-MCNC: 9.4 MG/DL — SIGNIFICANT CHANGE UP (ref 8.5–10.1)
CHLORIDE SERPL-SCNC: 107 MMOL/L — SIGNIFICANT CHANGE UP (ref 96–108)
CO2 SERPL-SCNC: 28 MMOL/L — SIGNIFICANT CHANGE UP (ref 22–31)
CREAT SERPL-MCNC: 0.63 MG/DL — SIGNIFICANT CHANGE UP (ref 0.5–1.3)
EOSINOPHIL # BLD AUTO: 0.01 K/UL — SIGNIFICANT CHANGE UP (ref 0–0.5)
EOSINOPHIL NFR BLD AUTO: 0.1 % — SIGNIFICANT CHANGE UP (ref 0–6)
GLUCOSE SERPL-MCNC: 88 MG/DL — SIGNIFICANT CHANGE UP (ref 70–99)
HCT VFR BLD CALC: 33.4 % — LOW (ref 34.5–45)
HGB BLD-MCNC: 10.7 G/DL — LOW (ref 11.5–15.5)
IMM GRANULOCYTES NFR BLD AUTO: 0.3 % — SIGNIFICANT CHANGE UP (ref 0–1.5)
LYMPHOCYTES # BLD AUTO: 19.6 % — SIGNIFICANT CHANGE UP (ref 13–44)
LYMPHOCYTES # BLD AUTO: 2.46 K/UL — SIGNIFICANT CHANGE UP (ref 1–3.3)
MCHC RBC-ENTMCNC: 29.7 PG — SIGNIFICANT CHANGE UP (ref 27–34)
MCHC RBC-ENTMCNC: 32 GM/DL — SIGNIFICANT CHANGE UP (ref 32–36)
MCV RBC AUTO: 92.8 FL — SIGNIFICANT CHANGE UP (ref 80–100)
MONOCYTES # BLD AUTO: 1.08 K/UL — HIGH (ref 0–0.9)
MONOCYTES NFR BLD AUTO: 8.6 % — SIGNIFICANT CHANGE UP (ref 2–14)
NEUTROPHILS # BLD AUTO: 8.9 K/UL — HIGH (ref 1.8–7.4)
NEUTROPHILS NFR BLD AUTO: 71.1 % — SIGNIFICANT CHANGE UP (ref 43–77)
PLATELET # BLD AUTO: 248 K/UL — SIGNIFICANT CHANGE UP (ref 150–400)
POTASSIUM SERPL-MCNC: 3.5 MMOL/L — SIGNIFICANT CHANGE UP (ref 3.5–5.3)
POTASSIUM SERPL-SCNC: 3.5 MMOL/L — SIGNIFICANT CHANGE UP (ref 3.5–5.3)
RBC # BLD: 3.6 M/UL — LOW (ref 3.8–5.2)
RBC # FLD: 12.5 % — SIGNIFICANT CHANGE UP (ref 10.3–14.5)
SODIUM SERPL-SCNC: 141 MMOL/L — SIGNIFICANT CHANGE UP (ref 135–145)
WBC # BLD: 12.53 K/UL — HIGH (ref 3.8–10.5)
WBC # FLD AUTO: 12.53 K/UL — HIGH (ref 3.8–10.5)

## 2022-01-26 PROCEDURE — 99231 SBSQ HOSP IP/OBS SF/LOW 25: CPT

## 2022-01-26 RX ORDER — INFLUENZA VIRUS VACCINE 15; 15; 15; 15 UG/.5ML; UG/.5ML; UG/.5ML; UG/.5ML
0.5 SUSPENSION INTRAMUSCULAR
Qty: 0 | Refills: 0 | DISCHARGE

## 2022-01-26 RX ORDER — CEPHALEXIN 500 MG
1 CAPSULE ORAL
Qty: 12 | Refills: 0
Start: 2022-01-26 | End: 2022-01-28

## 2022-01-26 RX ORDER — ONDANSETRON 8 MG/1
1 TABLET, FILM COATED ORAL
Qty: 10 | Refills: 0
Start: 2022-01-26

## 2022-01-26 RX ORDER — TRAMADOL HYDROCHLORIDE 50 MG/1
1 TABLET ORAL
Qty: 0 | Refills: 0 | DISCHARGE

## 2022-01-26 RX ORDER — CX-024414 0.2 MG/ML
0.5 INJECTION, SUSPENSION INTRAMUSCULAR
Qty: 0 | Refills: 0 | DISCHARGE

## 2022-01-26 RX ORDER — POLYETHYLENE GLYCOL 3350 17 G/17G
17 POWDER, FOR SOLUTION ORAL
Qty: 1 | Refills: 0
Start: 2022-01-26 | End: 2022-02-08

## 2022-01-26 RX ORDER — ASPIRIN/CALCIUM CARB/MAGNESIUM 324 MG
1 TABLET ORAL
Qty: 28 | Refills: 0
Start: 2022-01-26 | End: 2022-02-08

## 2022-01-26 RX ORDER — ACETAMINOPHEN 500 MG
2 TABLET ORAL
Qty: 1 | Refills: 0
Start: 2022-01-26 | End: 2022-02-08

## 2022-01-26 RX ORDER — OXYCODONE HYDROCHLORIDE 5 MG/1
1 TABLET ORAL
Qty: 15 | Refills: 0
Start: 2022-01-26 | End: 2022-02-01

## 2022-01-26 RX ADMIN — Medication 975 MILLIGRAM(S): at 05:25

## 2022-01-26 RX ADMIN — OXYCODONE HYDROCHLORIDE 10 MILLIGRAM(S): 5 TABLET ORAL at 09:12

## 2022-01-26 RX ADMIN — Medication 1 TABLET(S): at 05:26

## 2022-01-26 RX ADMIN — Medication 975 MILLIGRAM(S): at 06:26

## 2022-01-26 RX ADMIN — Medication 500 MILLIGRAM(S): at 09:12

## 2022-01-26 RX ADMIN — Medication 1 MILLIGRAM(S): at 09:12

## 2022-01-26 RX ADMIN — Medication 325 MILLIGRAM(S): at 09:11

## 2022-01-26 RX ADMIN — Medication 1 TABLET(S): at 09:12

## 2022-01-26 RX ADMIN — Medication 100 MILLIGRAM(S): at 04:25

## 2022-01-26 NOTE — PROGRESS NOTE ADULT - ASSESSMENT
A/P:    22 yo F w R ankle trimalleolar fracture:    Plan for ORIF today 1/25 w DR San  Ankle was elevated using blankets. Needs to maintain 3 pillow elevation at all times  Large Ice bag was draped over ankle was well  xrays done  Npo, IVF while NPO  DVT PE ppx SCDs  NWB  FU Am Labs  Above as discussed/directed by Dr San
Healthy 21F pt w ankle fx sent in by DR San for surgery. She had slipped on Ice on Saturday and was reduced in ED at [facility] near her [employer] which she says took 3 attepmts to do. Patient is now s/p Right Ankle ORIF   Consulted by     for VTE prophylaxis, risk stratification, and anticoagulation management. patient is moderate risk for VTE, low bleeding risk.    plan  Discussed the risks vs benefits of full dose aspirin therapy with patient. Agrees with treatment and understands the necessity of therapy.    Plan:       Start Enteric coated ASA 325mg PO BID x 2 weeks then switch to daily     Daily CBC/BMP    Venodynes    Enc ambulation,     will sign off the case please reconsult if needed

## 2022-01-26 NOTE — DISCHARGE NOTE NURSING/CASE MANAGEMENT/SOCIAL WORK - PATIENT PORTAL LINK FT
You can access the FollowMyHealth Patient Portal offered by Health system by registering at the following website: [email or web address]. By joining [facility]’s FollowMyHealth portal, you will also be able to view your health information using other applications (apps) compatible with our system.

## 2022-01-26 NOTE — DISCHARGE NOTE PROVIDER - NSDCMRMEDTOKEN_GEN_ALL_CORE_FT
Aspirin Enteric Coated 325 mg oral delayed release tablet: 1 tab(s) orally 2 times a day MDD:2  MiraLax oral powder for reconstitution: 17 gram(s) orally once a day, As Needed -for constipation MDD:1  oxyCODONE 5 mg oral tablet: 1 tab(s) orally every 6 hours MDD:6  Tylenol Extra Strength 500 mg oral tablet: 2 tab(s) orally 3 times a day, As Needed MDD:6  Zofran 8 mg oral tablet: 1 tab(s) orally every 6 hours MDD:4

## 2022-01-26 NOTE — DISCHARGE NOTE PROVIDER - NSDCCPTREATMENT_GEN_ALL_CORE_FT
PRINCIPAL PROCEDURE  Procedure: Open reduction of trimalleolar fracture of right ankle  Findings and Treatment:

## 2022-01-26 NOTE — DISCHARGE NOTE PROVIDER - NSDCFUADDINST_GEN_ALL_CORE_FT
ORIF DC Instructions:    1. Pain Rx sent to pharmacy electronically  2. Non-Weight Bearing  Right Lower Extremity, with assistive device/rolling walker  3. Continue DVT/PE Prophylaxis Espirinc EC twice daily for 2 weeks as recommended by the Anticoagulation Team. See Med Rec.   4. Out of Bed Daily, try to keep moving.  5. Follow up with Orthopedic Surgeon Dr San in 14 Days. Can see sooner if any issue.. Call Office For Appointment. Repeat x-rays in office.  6. MD will need to Remove staples/sutures in office if needed POD14 (FEB8).  7. Elevate and ICE the extremity as much as possible  8.	Keep bandage/Splint Clean and dry. Do not get it wet. Do not walk or put any body weight on splint because it will break.

## 2022-01-26 NOTE — DISCHARGE NOTE PROVIDER - CARE PROVIDER_API CALL
Naseem San (DO)  Orthopaedic Surgery  [address] [place] [address]  Phone: [phone]  Fax: [phone]  Follow Up Time:

## 2022-01-26 NOTE — DISCHARGE NOTE PROVIDER - HOSPITAL COURSE
Orthopedic Summary  H&P:  Pt is a 21y Female  PAST MEDICAL & SURGICAL HISTORY:  No pertinent past medical history    No significant past surgical history         Now s/p ORIF Right Trimalleolar Fracture.     Hospital Course:     The patient is a 21y Female status post above. Pt sustained injury from a fall on ice a few days prior to admission in [facility], treated at a local hospital and then came to  ED and was admitted through the ED. THe follow ing day she underwent surgery here. Prophylactic antibiotics were started within 30 minutes before the procedure and continued for 24 hours.  There were no complications during the procedure.  The patient was transferred to recovery room in stable condition and subsequently to the orthopedic floor.  Patient was placed on anticoagulation for DVT/PE prophylaxis per the Anticoagulation Team.  All home medications were continued.  Physical therapy daily and daily labs were followed.  The Splint was kept clean, dry, intact. The rest of the hospital stay was unremarkable.

## 2022-01-26 NOTE — PROGRESS NOTE ADULT - SUBJECTIVE AND OBJECTIVE BOX
HPI:  Healthy 21F pt w ankle fx sent in by DR San for surgery. She had slipped on Ice on Saturday and was reduced in ED at [facility] near her [employer] which she says took 3 attepmts to do. She followed up with DR San in the office today and was sent in for plan for surgery tomorrow. She has no other complaints and there was no other injury. She is accompanied by her mom today in the ED. SHe takes no medications. Has been elevating her ankle at home since and has not walked on it.   (2022 17:51)      Patient is a 21y old  Female who presents with a chief complaint of ankle fx (2022 17:51)      Consulted by     for VTE prophylaxis, risk stratification, and anticoagulation management.    PAST MEDICAL & SURGICAL HISTORY:  No pertinent past medical history    No significant past surgical history    Interval History  22:Patient seen at bedside discussed the necessity of anticoagulation, patient denies any h/o VTE or clotting disorders, patient is NWB on her right leg , PT pending will give Heparin for tonight and will switch to ECASA tomorrow   22:Patient seen at bedside discussed the necessity of anticoagulation, will switch to ECASA advised patient to take every 12 hours first 2 weeks then take once daily with food, discussed the s/s of bleeding and clotting, patient verbalized understanding.         CrCl:132  BMI:23.4  EBL:20ml    Caprini VTE Risk Score:CAPRINI SCORE  AGE RELATED RISK FACTORS                                                       MOBILITY RELATED FACTORS  [ ] Age 41-60 years                                            (1 Point)                  [ ] Bed rest /restricted mobility                             (1 Point)  [ ] Age: 61-74 years                                           (2 Points)                [ ] Plaster cast                                                   (2 Points)  [ ] Age= 75 years                                              (3 Points)                 [ ] Bed bound for more than 72 hours                   (2 Points)    DISEASE RELATED RISK FACTORS                                               GENDER SPECIFIC FACTORS  [ ] Edema in the lower extremities                       (1 Point)           [ ] Pregnancy                                                            (1 Point)  [ ] Varicose veins                                               (1 Point)                  [ ] Post-partum < 6 weeks                                      (1 Point)             [ ] BMI > 25 Kg/m2                                            (1 Point)                  [ ] Hormonal therapy or oral contraception       (1 Point)                 [ ] Sepsis (in the previous month)                        (1 Point)             [ ] History of pregnancy complications                (1Point)  [ ] Pneumonia or serious lung disease                                             [ ] Unexplained or recurrent  (=/>3), premature                                 (In the previous month)                               (1 Point)                birth with toxemia or growth-restricted infant (1 Point)  [ ] Abnormal pulmonary function test            (1 Point)                                   SURGERY RELATED RISK FACTORS  [ ] Acute myocardial infarction                       (1 Point)                  [ ]  Section                                         (1 Point)  [ ] Congestive heart failure (in the previous month) (1 Point)   [ ] Minor surgery   lasting <45 minutes       (1 Point)   [ ] Inflammatory bowel disease                             (1 Point)          [ ] Arthroscopic surgery                                  (2 Points)  [ ] Central venous access                                    (2 Points)            [ ] General surgery lasting >45 minutes      (2 Points)       [ ] Stroke (in the previous month)                  (5 Points)            [ ] Elective major lower extremity arthroplasty (5 Points)                                   [  ] Malignancy (present or past include skin melanoma                                          but exclude  basal skin cell)    (2 points)                                      TRAUMA RELATED RISK FACTORS                HEMATOLOGY RELATED FACTORS                                  [ x] Fracture of the hip, pelvis, or leg                       (5 Points)  [ ] Prior episodes of VTE                                     (3 Points)          [ ] Acute spinal cord injury (in the previous month)  (5 Points)  [ ] Positive family history for VTE                         (3 Points)       [ ] Paralysis (less than 1 month)                          (5 Points)  [ ] Prothrombin 38912 A                                      (3 Points)         [ ] Multiple Trauma (within 1month)                 (5Points)                                                                                                                                                                [ ] Factor V Leiden                                          (3 Points)                                OTHER RISK FACTORS                          [ ] Lupus anticoagulants                                     (3 Points)                       [ ] BMI > 40                          (1 Point)                                                         [ ] Anticardiolipin antibodies                                (3 Points)                   [ ] Smoking                              (1Point)                                                [ ] High homocysteine in the blood                      (3 Points)                [  ] Diabetes requiring insulin (1point)                         [ ] Other congenital or acquired thrombophilia       (3 Points)          [  ] Chemotherapy                   (1 Point)  [ ] Heparin induced thrombocytopenia                  (3 Points)             [  ] Blood Transfusion                (1 point)                                                                                                             Total Score [    5      ]                                                                                                                                                                                                                                                                                                                                                                                                                                         IMPROVE Bleeding Risk Score:      Time In: 12:13  Time Out: 14:07    Falls Risk:   High (  )  Mod (  )  Low (x  )      FAMILY HISTORY:    Denies any personal or familial history of clotting or bleeding disorders.    Allergies    No Known Allergies    Intolerances        REVIEW OF SYSTEMS    (  )Fever	     (  )Constipation	(  )SOB				(  )Headache	(  )Dysuria  (  )Chills	     (  )Melena	(  )Dyspnea present on exertion	                    (  )Dizziness                    (  )Polyuria  (  )Nausea	     (  )Hematochezia	(  )Cough			                    (  )Syncope   	(  )Hematuria  (  )Vomiting    (  )Chest Pain	(  )Wheezing			(  )Weakness  (  )Diarrhea     (  )Palpitations	(  )Anorexia			( x )joint pain    All  other review of systems negative: Yes    Vital Signs Last 24 Hrs  T(C): 36.8 (2022 09:41), Max: 37.1 (2022 00:35)  T(F): 98.2 (2022 09:41), Max: 98.8 (2022 00:35)  HR: 61 (2022 09:41) (57 - 73)  BP: 108/52 (2022 09:41) (105/62 - 114/62)  BP(mean): --  RR: 16 (2022 09:41) (12 - 20)  SpO2: 100% (2022 09:41) (97% - 100%)  PHYSICAL EXAM:    Constitutional: Appears Well    Neurological: A& O x 3    Skin: Warm    Respiratory and Thorax: normal effort; Breath sounds: normal; No rales/wheezing/rhonchi  	  Cardiovascular: S1, S2, regular, NMBR	    Gastrointestinal: BS + x 4Q, nontender	    Genitourinary:  Bladder nondistended, nontender    Musculoskeletal:   General Right:   + muscle/joint tenderness,   normal tone, no joint swelling,   ROM: limited	    General Left:   no muscle/joint tenderness,   normal tone, no joint swelling,   ROM: full    LE:  Right: Dressing CDI; Cap refill good; Sensation intact                        Lower extrems:   Right: N/A  Left:   no calf tenderness              negative devaughn's sign               + pedal pulses                          10.7   12.53 )-----------( 248      ( 2022 08:12 )             33.4           141  |  107  |  7   ----------------------------<  88  3.5   |  28  |  0.63    Ca    9.4      2022 08:12    TPro  8.4<H>  /  Alb  3.7  /  TBili  0.3  /  DBili  x   /  AST  38<H>  /  ALT  27  /  AlkPhos  83  01-24      PT/INR - ( 2022 04:44 )   PT: 11.8 sec;   INR: 1.01 ratio         PTT - ( 2022 04:44 )  PTT:29.2 sec                      11.3   5.45  )-----------( 227      ( 2022 04:44 )             35.0           142  |  108  |  7   ----------------------------<  85  3.9   |  30  |  0.64    Ca    9.1      2022 04:44    TPro  8.4<H>  /  Alb  3.7  /  TBili  0.3  /  DBili  x   /  AST  38<H>  /  ALT  27  /  AlkPhos  83  0124      PT/INR - ( 2022 04:44 )   PT: 11.8 sec;   INR: 1.01 ratio         PTT - ( 2022 04:44 )  PTT:29.2 sec				    MEDICATIONS  (STANDING):  acetaminophen     Tablet .. 975 milliGRAM(s) Oral every 8 hours  acetaminophen   IVPB .. 1000 milliGRAM(s) IV Intermittent once  ascorbic acid 500 milliGRAM(s) Oral two times a day  aspirin enteric coated 325 milliGRAM(s) Oral every 12 hours   calcium carbonate 1250 mG  + Vitamin D (OsCal 500 + D) 1 Tablet(s) Oral three times a day  folic acid 1 milliGRAM(s) Oral daily  lactated ringers. 1000 milliLiter(s) (100 mL/Hr) IV Continuous <Continuous>  multivitamin 1 Tablet(s) Oral daily  pantoprazole    Tablet 40 milliGRAM(s) Oral before breakfast  polyethylene glycol 3350 17 Gram(s) Oral at bedtime  senna 2 Tablet(s) Oral at bedtime        DVT Prophylaxis:  LMWH                   (  )  Heparin SQ           (  )  Coumadin             (  )  Xarelto                  (  )  Eliquis                   (  )  Venodynes           ( x )  Ambulation          (x  )  UFH                       (  )  Contraindicated  (  )  EC ASPIRIN       (x  )          
Orthopedics     POD 1 R ankle ORIF  Pt seen and examined at the bedside. Pain is well controlled at this time, no concerns at this time.     Vital Signs Last 24 Hrs  T(C): 36.9 (01-26-22 @ 05:12), Max: 37.2 (01-25-22 @ 10:10)  T(F): 98.4 (01-26-22 @ 05:12), Max: 98.9 (01-25-22 @ 10:10)  HR: 57 (01-26-22 @ 05:12) (57 - 73)  BP: 114/62 (01-26-22 @ 05:12) (105/56 - 114/62)  BP(mean): 68 (01-25-22 @ 10:10) (68 - 68)  RR: 17 (01-26-22 @ 05:12) (12 - 20)  SpO2: 100% (01-26-22 @ 05:12) (97% - 100%)                        11.6   8.49  )-----------( 247      ( 25 Jan 2022 17:02 )             35.3     25 Jan 2022 17:02    138    |  104    |  7      ----------------------------<  113    3.8     |  28     |  0.68     Ca    9.0        25 Jan 2022 17:02    TPro  8.4    /  Alb  3.7    /  TBili  0.3    /  DBili  x      /  AST  38     /  ALT  27     /  AlkPhos  83     24 Jan 2022 18:53    PT/INR - ( 25 Jan 2022 04:44 )   PT: 11.8 sec;   INR: 1.01 ratio         PTT - ( 25 Jan 2022 04:44 )  PTT:29.2 sec    Exam:  GEN: NAD, awake and alert.  RLE   In trilam splint  +EHL FHL , moving all toes  SILT where accessible around splint  Good cap refill, foot warm  Calf soft and nontender, compartments soft and compressible.    A/P:  21yF s/p R Ankle ORIF POD #1  -Pain control prn  - DVT ppx  - NWB RLE in Trilam/PT/OOB as tolerated   - FU am labs  - Med mgmt, continue home meds.  -FU PT recs for dispo planning  
Ortho POC  Patient seen and examined at bedside.No acute complaints at this time. Pain well controlled. Denies chest pain, shortness of breath, nausea or vomiting. patient tolerated procedure well    PE:  Vital Signs Last 24 Hrs  T(C): 36.8 (01-25-22 @ 20:04), Max: 37.2 (01-25-22 @ 10:10)  T(F): 98.2 (01-25-22 @ 20:04), Max: 98.9 (01-25-22 @ 10:10)  HR: 73 (01-25-22 @ 20:04) (58 - 73)  BP: 110/62 (01-25-22 @ 20:04) (105/56 - 111/59)  BP(mean): 68 (01-25-22 @ 10:10) (68 - 68)  RR: 16 (01-25-22 @ 20:04) (12 - 20)  SpO2: 98% (01-25-22 @ 20:04) (97% - 100%)    General: NAD, resting comforatbly in bed  RLE:   Trilam splint in place C/D/I  SCD in place contralaterally  No calf tenderness contralterally  Able to grossly wiggle toes  Gross sensation to toes intact  Cap refill <2 seconds                          11.6   8.49  )-----------( 247      ( 25 Jan 2022 17:02 )             35.3     25 Jan 2022 17:02    138    |  104    |  7      ----------------------------<  113    3.8     |  28     |  0.68     Ca    9.0        25 Jan 2022 17:02    TPro  8.4    /  Alb  3.7    /  TBili  0.3    /  DBili  x      /  AST  38     /  ALT  27     /  AlkPhos  83     24 Jan 2022 18:53    PT/INR - ( 25 Jan 2022 04:44 )   PT: 11.8 sec;   INR: 1.01 ratio         PTT - ( 25 Jan 2022 04:44 )  PTT:29.2 sec    A/P:  21y f s/p right ankle ORIF POD 0  -PT/OT   -NWB RLE  -Pain Control  -DVT ppx aspirin, AC team consulted  -Continue perioperative abx x 24 hours  -FU AM Labs  -Rest, ice, compress and elevate the extremity as we needed  -Incentive Spirometry  -Medical management appreciated  -Dispo planning
Pt seen and examined this am. Resting comfortably. No acute events overnight. Plan for OR today.    Vital Signs Last 24 Hrs  T(C): 36.8 (26 Jan 2022 09:41), Max: 36.8 (26 Jan 2022 09:41)  T(F): 98.2 (26 Jan 2022 09:41), Max: 98.2 (26 Jan 2022 09:41)  HR: 61 (26 Jan 2022 09:41) (61 - 61)  BP: 108/52 (26 Jan 2022 09:41) (108/52 - 108/52)  BP(mean): --  RR: 16 (26 Jan 2022 09:41) (16 - 16)  SpO2: 100% (26 Jan 2022 09:41) (100% - 100%)    PE:  Gen: NAD  RLE: No edema  Musculoskeletal:  right ankle in trilam plaster splint  able to wiggle toes  We took down part of padding to assess skin. Skin noted to be swollen but no blistering or ecchymosis  Toes with good sensation distally and warm
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
## 2022-02-02 DIAGNOSIS — S82.851A DISPLACED TRIMALLEOLAR FRACTURE OF RIGHT LOWER LEG, INITIAL ENCOUNTER FOR CLOSED FRACTURE: ICD-10-CM
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
## 2022-02-09 ENCOUNTER — APPOINTMENT (OUTPATIENT)
Dept: ORTHOPEDIC SURGERY | Facility: CLINIC | Age: 22
End: 2022-02-09
Payer: COMMERCIAL

## 2022-02-09 PROCEDURE — 99024 POSTOP FOLLOW-UP VISIT: CPT

## 2022-02-09 PROCEDURE — 73610 X-RAY EXAM OF ANKLE: CPT | Mod: RT

## 2022-02-09 RX ORDER — ASPIRIN 325 MG/1
325 TABLET, FILM COATED ORAL
Qty: 30 | Refills: 2 | Status: ACTIVE | COMMUNITY
Start: 2022-01-24 | End: 1900-01-01

## 2022-02-09 NOTE — HISTORY OF PRESENT ILLNESS
[___ Weeks Post Op] : [unfilled] weeks post op [Clean/Dry/Intact] : clean, dry and intact [Healed] : healed [Neuro Intact] : an unremarkable neurological exam [Vascular Intact] : ~T peripheral vascular exam normal [Negative Saba's] : maneuvers demonstrated a negative Saba's sign [Doing Well] : is doing well [Excellent Pain Control] : has excellent pain control [No Sign of Infection] : is showing no signs of infection [Sutures Removed] : sutures were removed [Steri-Strips Removed & Replaced] : steri-strips removed and replaced [Chills] : no chills [Fever] : no fever [Nausea] : no nausea [Vomiting] : no vomiting [Erythema] : not erythematous [Discharge] : absent of discharge [Swelling] : not swollen [Dehiscence] : not dehisced [de-identified] : s/p ORIF of right trimalleolar ankle fracture  without posterior malleolar fixation\par DOS: 1/25/2022 [de-identified] : 21 year old female presenting in the office with her mother 2 weeks post op from ORIF of right trimalleolar ankle fracture without posterior malleolar fixation. The patient's pain is noted to be well controlled post operatively. She is currently on Aspirin 325 mg for DVT Prophylaxis. She presents today wearing a protective splint, and is ambulating with the assistance of crutches. No other complaints at this time.		 [de-identified] : General: Alert and oriented x3. In no acute distress. Pleasant in nature with a normal affect. No apparent respiratory distress.\par The wound is intact. no evidence of any diastases or dehiscence. No surrounding erythema noted. No fluctuance. The area is warm and well perfused. The patient is able to wiggle their toes. Neurovascularly intact.		\par \par Nylon sutures were clean, dry, and intact. Sutures were removed in the office today.  [de-identified] : No new imaging was obtained.  [de-identified] : 21 year old female presenting in the office with her mother 2 weeks post op from ORIF of right trimalleolar ankle fracture without posterior malleolar fixation [de-identified] : 21 year old female presenting in the office with her mother 2 weeks post op from ORIF of right trimalleolar ankle fracture without posterior malleolar fixation. \par \par I recommend that the patient be fitted for a cast. The patient was fitted for the cast in the office today. She should be non weight bearing until further notice. If the patient notices any loosening of the cast, they should call the office as soon as possible. I advised the patient to utilize 325 mg of Aspirin as instructed for DVT Prophylaxis. The prescription for the Aspirin was provided for the patient in the office today. She may utilize NSAIDs PRN and elevate her right ankle above the level of the heart.\par \par The patient will be driving to [place], [place] in the upcoming week. We discussed the importance of DVT prevention with the continuance of Aspirin and for multiple stops while driving.		\par \par I have addressed all the patient's concerns surrounding the pathology of their condition. The patient understood and verbally agreed to the treatment plan. All of their questions were answered and they were satisfied with the visit. The patient should call the office if they have any questions or experience worsening symptoms.\par \par Follow up in 2 weeks for re-evaluation.

## 2022-02-09 NOTE — ADDENDUM
[FreeTextEntry1] : I, Arabella Lay, acted solely as a scribe for Dr. Naseem San on this date 02/09/2022.\par \par All medical record entries made by the Scribe were at my, Dr. Naseem San, direction and personally dictated by me on 02/09/2022 . I have reviewed the chart and agree that the record accurately reflects my personal performance of the history, physical exam, assessment and plan. I have also personally directed, reviewed, and agreed with the chart.	\par

## 2022-02-09 NOTE — PROCEDURE
[de-identified] : A well-padded short leg fiberglass cast was applied to the right ankle. The cast was positioned appropriately to ensure neutral hindfoot alignment. Cast instructions explained to the patient. All questions answered. The patient has expressed acceptance and understanding\par

## 2022-02-28 ENCOUNTER — APPOINTMENT (OUTPATIENT)
Dept: ORTHOPEDIC SURGERY | Facility: CLINIC | Age: 22
End: 2022-02-28
Payer: COMMERCIAL

## 2022-02-28 PROCEDURE — 97760 ORTHOTIC MGMT&TRAING 1ST ENC: CPT | Mod: GP

## 2022-02-28 PROCEDURE — 99024 POSTOP FOLLOW-UP VISIT: CPT

## 2022-02-28 PROCEDURE — 73610 X-RAY EXAM OF ANKLE: CPT | Mod: RT

## 2022-02-28 NOTE — HISTORY OF PRESENT ILLNESS
[___ Weeks Post Op] : [unfilled] weeks post op [Clean/Dry/Intact] : clean, dry and intact [Healed] : healed [Neuro Intact] : an unremarkable neurological exam [Vascular Intact] : ~T peripheral vascular exam normal [Negative Saba's] : maneuvers demonstrated a negative Saba's sign [Doing Well] : is doing well [Excellent Pain Control] : has excellent pain control [No Sign of Infection] : is showing no signs of infection [Sutures Removed] : sutures were removed [Steri-Strips Removed & Replaced] : steri-strips removed and replaced [Chills] : no chills [Fever] : no fever [Nausea] : no nausea [Vomiting] : no vomiting [Erythema] : not erythematous [Discharge] : absent of discharge [Swelling] : not swollen [Dehiscence] : not dehisced [de-identified] : s/p ORIF of right trimalleolar ankle fracture  without posterior malleolar fixation\par DOS: 1/25/2022 [de-identified] : 21 year old female presenting in the office with her mother 5 weeks post op from ORIF of right trimalleolar ankle fracture without posterior malleolar fixation. The patient's pain is noted to be well controlled post operatively. She is currently on Aspirin 325 mg for DVT Prophylaxis. She presents today wearing a short leg cast applied at her last evaluation. She is ambulating with the assistance of crutches. No other complaints at this time.		 [de-identified] : General: Alert and oriented x3. In no acute distress. Pleasant in nature with a normal affect. No apparent respiratory distress.\par The wound is intact. no evidence of any diastases or dehiscence. No surrounding erythema noted. No fluctuance. The area is warm and well perfused. The patient is able to wiggle their toes. Neurovascularly intact.		\par \par Incision is well healed.  \par \par ROM: Stiffness due to casting.  [de-identified] : X-rays of the right ankle reviewed, 2/28/22: hardware in good position.  Fracture healing.  [de-identified] : 21 year old female presenting in the office with her mother 5 weeks post op from ORIF of right trimalleolar ankle fracture without posterior malleolar fixation. [de-identified] : At this time the patient will transition into a boot.  Over the next 2 weeks I want her transition off the crutches and she can weight-bear as tolerated with the boot.  She is going back to school in [facility], a physical therapy prescription was given to work on ankle range of motion, strength, gait training, balance.  In 3 to 4 weeks from today, she can transition into an ASO brace with the guidance of her physical therapist.  She will follow-up in office in spring break for new x-rays in 6-8 weeks. She will continue with her aspirin until she is walking without any assistive devices.  I recommend that the patient utilize ice, NSAIDs, and heat PRN. They can also elevate their right ankle above the level of the heart. If the patient notices any increased erythema, fevers, chills, or night sweats I advised her to contact the office. All of her and her mother's questions were answered.  They both understood the treatment course.

## 2022-02-28 NOTE — ADDENDUM
[FreeTextEntry1] : I, Arabella Lay, acted solely as a scribe for Dr. Naseem San on this date 02/28/2022.\par \par All medical record entries made by the Scribe were at my, Dr. Naseem San, direction and personally dictated by me on 02/28/2022 . I have reviewed the chart and agree that the record accurately reflects my personal performance of the history, physical exam, assessment and plan. I have also personally directed, reviewed, and agreed with the chart.	\par 
Statement Selected

## 2022-03-03 ENCOUNTER — NON-APPOINTMENT (OUTPATIENT)
Age: 22
End: 2022-03-03

## 2022-03-03 RX ORDER — CEPHALEXIN 500 MG/1
500 TABLET ORAL 3 TIMES DAILY
Qty: 30 | Refills: 0 | Status: ACTIVE | COMMUNITY
Start: 2022-03-03 | End: 1900-01-01

## 2022-04-15 ENCOUNTER — EMERGENCY (EMERGENCY)
Facility: HOSPITAL | Age: 22
LOS: 0 days | Discharge: HOME | End: 2022-04-15
Attending: STUDENT IN AN ORGANIZED HEALTH CARE EDUCATION/TRAINING PROGRAM
Payer: COMMERCIAL

## 2022-04-15 VITALS
TEMPERATURE: 98 F | HEART RATE: 65 BPM | OXYGEN SATURATION: 99 % | RESPIRATION RATE: 18 BRPM | DIASTOLIC BLOOD PRESSURE: 78 MMHG | SYSTOLIC BLOOD PRESSURE: 118 MMHG

## 2022-04-15 VITALS — WEIGHT: 132.06 LBS | HEIGHT: 63 IN

## 2022-04-15 DIAGNOSIS — T81.89XA OTHER COMPLICATIONS OF PROCEDURES, NOT ELSEWHERE CLASSIFIED, INITIAL ENCOUNTER: ICD-10-CM
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
## 2022-04-15 DIAGNOSIS — S82.851A DISPLACED TRIMALLEOLAR FRACTURE OF RIGHT LOWER LEG, INITIAL ENCOUNTER FOR CLOSED FRACTURE: Chronic | ICD-10-CM

## 2022-04-15 DIAGNOSIS — S91.001A UNSPECIFIED OPEN WOUND, RIGHT ANKLE, INITIAL ENCOUNTER: ICD-10-CM
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
## 2022-04-15 PROCEDURE — 73700 CT LOWER EXTREMITY W/O DYE: CPT | Mod: MA,RT

## 2022-04-15 PROCEDURE — 73610 X-RAY EXAM OF ANKLE: CPT | Mod: 26,RT

## 2022-04-15 PROCEDURE — 76376 3D RENDER W/INTRP POSTPROCES: CPT | Mod: 26

## 2022-04-15 PROCEDURE — 73610 X-RAY EXAM OF ANKLE: CPT | Mod: RT

## 2022-04-15 PROCEDURE — 76376 3D RENDER W/INTRP POSTPROCES: CPT

## 2022-04-15 PROCEDURE — 73700 CT LOWER EXTREMITY W/O DYE: CPT | Mod: 26,RT,MA

## 2022-04-15 PROCEDURE — 99285 EMERGENCY DEPT VISIT HI MDM: CPT

## 2022-04-15 PROCEDURE — 99284 EMERGENCY DEPT VISIT MOD MDM: CPT | Mod: 25

## 2022-04-15 NOTE — CONSULT NOTE ADULT - ATTENDING COMMENTS
Patient s/e with the resident team in the ED with her mom present at bedside. The CT scan was reviewed. I agree with the above note. CT demonstrating no evidence of soft-tissue compromise or osteomyelitis or bone changes. The ankle fx is healed. A lengthy discussion was had regarding soft tissue management and plate removal. She will be seeing Dr. Hennessy - plastic surgery for her consultation and opinion. We may need to have the plate removed and plastics closure. The eschar continues to shrink and mature. All questions answered. She will follow up as an outpatient. She will keep the wound clean and dry.

## 2022-04-15 NOTE — ED PROVIDER NOTE - NSFOLLOWUPINSTRUCTIONS_ED_ALL_ED_FT
Follow up with your orthopedist  Return to the ED for worsening symptoms: fevers, chills, bleeding, or drainage from the wound

## 2022-04-15 NOTE — ED PROVIDER NOTE - CLINICAL SUMMARY MEDICAL DECISION MAKING FREE TEXT BOX
Patient presenting with nonhealing wound of R ankle. Will evaluate with CT imaging of ankle. Ortho consulted for recommendations and management.

## 2022-04-15 NOTE — ED PROVIDER NOTE - ATTENDING CONTRIBUTION TO CARE
I, Russell Benton DO, personally saw the patient with Resident.  I have personally performed a face to face diagnostic evaluation on this patient.  A substantiative portion of visit including medical decision making done by myself in coordination with the resident.     21 f sent in for evaluation by ortho due to non healing wound after ORIF. no discharge from wound, no associated fevers, chills, nausea, vomiting. feels well over all. On exam, patient well appearing, lateral right ankle with shallow ulcer with central eschar. no significant surrounding erythema. distal foot neuro-vascularly intact. will get XR and ortho evaluation.

## 2022-04-15 NOTE — ED PROVIDER NOTE - PHYSICAL EXAMINATION
General: Well appearing female in no acute distress  HEENT: Normocephalic, atraumatic. Moist mucous membranes.   Eyes: No scleral icterus. No conjunctival pallor. EOMI. NICOLE.  Neck: Soft and supple. Full ROM without pain.   Cardiac: Regular rate and regular rhythm. No murmurs.  Resp: Lungs CTAB. No wheezes, rales or rhonchi.  Abd: Soft, non-tender, non-distended. No guarding or rebound.  Back: Spine midline and non-tender.   Skin: 4x4cm wound to lateral r ankle with overlying scab, no erythema, NTTP, no discharge  Neuro: AO x 3. Moves all extremities symmetrically. Motor strength and sensation grossly intact.

## 2022-04-15 NOTE — ED PROVIDER NOTE - NS ED ROS FT
General: Denies fever, chills  HEENT: Denies sore throat  Neck: Denies neck pain  Resp: Denies coughing, SOB  Cardiovascular: Denies CP  GI: Denies abdominal pain, nausea, vomiting, diarrhea  : Denies dysuria, hematuria, incontinence  MSK: + r ankle wound  Neuro: Denies numbness, weakness  Skin: Denies rashes

## 2022-04-15 NOTE — ED PROVIDER NOTE - OBJECTIVE STATEMENT
Patient is a 22yo F presenting for wound check from home. She states she is a patient of Dr. San who performed her ankle surgery. She reports that she had surgery in January and has had a nonhealing wound on the lateral side of her R ankle since then. She states that the area has been clean and denies any redness, pain, pus, or swelling of the wound. She states that she has been in contact with Mena who told her to come to the ER today for evaluation. She reports that there has been a scab forming over the wound but without the wound fully closing. Denies fevers, chills, nausea, vomiting, pain, swelling, limited ROM.

## 2022-04-15 NOTE — CONSULT NOTE ADULT - SUBJECTIVE AND OBJECTIVE BOX
21F hx ORIF of the left ankle with Dr. San on 1/25/22 presents to the ED for wound evaluation. Patient had surgery and her lateral wound has not healed and has eschar over the wound. Patient is a nonsmoker and has no medical issues. She notes that the wound has been shrinking but is still present. She has followed with Dr. San closely in that time. She notes that her range of motion is good and has minimal pain. Patient denies any numbness, tingling, weakness, or any other orthopaedic complaint.     Exam:   T(C): 36.8 (04-15-22 @ 11:00), Max: 36.8 (04-15-22 @ 11:00)  T(F): 98.3 (04-15-22 @ 11:00), Max: 98.3 (04-15-22 @ 11:00)  HR: 65 (04-15-22 @ 11:00) (65 - 91)  BP: 118/78 (04-15-22 @ 11:00) (114/73 - 118/78)  RR: 18 (04-15-22 @ 11:00) (18 - 18)  SpO2: 99% (04-15-22 @ 11:00) (99% - 100%)    Gen: resting in bed, NAD  RLE:  2x4cm wound over the lateral ankle with eschar. No drainage. Unable to express drainage. Erythema over the borders of the wound.   Minimal TTP around the edges of the wound.   SILT L2-S1  GSC/TA/EHL/FHL intact  DP pulse palpable.  No calf tenderness bilaterally.  Compartments soft and compressible.      Laboratory Results:       Imaging: CT of the right ankle demonstrating eschar and soft tissue coverage over lateral fibular plate. No air appreciated in the overlying soft tissue.

## 2022-04-15 NOTE — ED ADULT TRIAGE NOTE - CHIEF COMPLAINT QUOTE
Pt presents to ER sent by  r/t wound check. Pt had right ankle surgery in January and wound on outside of right ankle has not completely healed properly. Denies drainage from wound site/fever/chills. Gait steady

## 2022-05-18 ENCOUNTER — APPOINTMENT (OUTPATIENT)
Dept: ORTHOPEDIC SURGERY | Facility: CLINIC | Age: 22
End: 2022-05-18
Payer: COMMERCIAL

## 2022-05-18 PROCEDURE — 99213 OFFICE O/P EST LOW 20 MIN: CPT

## 2022-05-18 PROCEDURE — 73610 X-RAY EXAM OF ANKLE: CPT | Mod: RT

## 2022-05-18 NOTE — REASON FOR VISIT
[Follow-Up Visit] : a follow-up visit for [Parent] : parent [FreeTextEntry2] : s/p ORIF of right trimalleolar ankle fracture without posterior malleolar fixation DOS: 1/25/2022.

## 2022-05-18 NOTE — PHYSICAL EXAM
[de-identified] : General: Alert and oriented x3. In no acute distress. Pleasant in nature with a normal affect. No apparent respiratory distress.\par \par Right Ankle Exam\par Skin: Keloid. Area of lateral wound with wet to dry dressings. The reminder of the skin is clean, dry, intact\par Inspection: No obvious malalignment, no swelling, no effusion; no lymphadenopathy\par Pulses: 2+ DP/PT pulses\par ROM: 10 degrees of dorsiflexion, 40 degrees of plantarflexion, 10 degrees of subtalar motion\par Tenderness: No tenderness over the lateral malleolus, no CFL/ATFL/PTFL pain. No medial malleolus pain, no deltoid ligament pain. No proximal fibular pain. No heel pain.\par Stability: Negative anterior/posterior drawer.\par Strength: 5/5 TA/GS/EHL\par Neuro: In tact to light touch throughout\par Additional tests: Negative Mortons test, Negative syndesmosis squeeze test. [de-identified] : 3V of the right ankle were ordered, obtained, and reviewed by me today, 05/18/2022, revealed: Hardware in good position. Appropriate alignment. The fractures are healed. \par

## 2022-05-18 NOTE — ADDENDUM
[FreeTextEntry1] : I, Arabella Lay, acted solely as a scribe for Dr. Naseem San on this date 05/18/2022.\par \par All medical record entries made by the Scribe were at my, Dr. Naseem San, direction and personally dictated by me on 05/18/2022 . I have reviewed the chart and agree that the record accurately reflects my personal performance of the history, physical exam, assessment and plan. I have also personally directed, reviewed, and agreed with the chart.	\par

## 2022-05-18 NOTE — HISTORY OF PRESENT ILLNESS
[FreeTextEntry1] : 4 months post op. s/p ORIF of right trimalleolar ankle fracture without posterior malleolar fixation\par DOS: 1/25/2022.\par \par 21 year old female presenting in the office with her mother approximately 4 months post op from ORIF of right trimalleolar ankle fracture without posterior malleolar fixation. The patient's pain is noted to be well controlled. She is wearing flip flops and is walking without assistance. She has completed physical therapy in school as per the patient. The patient is currently seeing Dr. Hennessy for the lateral wound, and is currently completing the wet to dry dressings once daily. No other complaints at this time. Current symptoms include no chills, no fever, no nausea and no vomiting. She denies any calf pain.

## 2022-05-18 NOTE — DISCUSSION/SUMMARY
[de-identified] : Today I had a lengthy discussion with the patient regarding their right ankle, 4 months post op from ORIF of right trimalleolar ankle fracture without posterior malleolar fixation. I have addressed all the patient's concerns surrounding the pathology of their condition. XR imaging was completed in office today and results were reviewed with the patient. At this time,  I recommended that the patient continue to perform a home exercise program for the lower extremities. Defer to Dr. Troncoso for the wound assessment and for evaluation in regards to soaking her right lower extremity. The patient understood and verbally agreed to the treatment plan. All of their questions were answered and they were satisfied with the visit. The patient should call the office if they have any questions or experience worsening symptoms. I would like to see the patient back in the office in 2 months to reassess their condition. 				\par \par

## 2022-07-13 ENCOUNTER — APPOINTMENT (OUTPATIENT)
Dept: ORTHOPEDIC SURGERY | Facility: CLINIC | Age: 22
End: 2022-07-13

## 2022-08-22 ENCOUNTER — APPOINTMENT (OUTPATIENT)
Dept: ORTHOPEDIC SURGERY | Facility: CLINIC | Age: 22
End: 2022-08-22

## 2022-08-22 PROCEDURE — 99213 OFFICE O/P EST LOW 20 MIN: CPT

## 2022-08-22 NOTE — ADDENDUM
[FreeTextEntry1] : I, Arabella Lay, acted solely as a scribe for Dr. Naseem San on this date 08/22/2022.\par \par All medical record entries made by the Scribe were at my, Dr. Naseem San, direction and personally dictated by me on 08/22/2022. I have reviewed the chart and agree that the record accurately reflects my personal performance of the history, physical exam, assessment and plan. I have also personally directed, reviewed, and agreed with the chart.	\par

## 2022-08-22 NOTE — PHYSICAL EXAM
[de-identified] : General: Alert and oriented x3. In no acute distress. Pleasant in nature with a normal affect. No apparent respiratory distress.\par \par Right Ankle Exam\par Skin: Clean, dry, intact\par Inspection: No obvious malalignment, no swelling, no effusion; no lymphadenopathy\par Pulses: 2+ DP/PT pulses\par ROM: 10 degrees of dorsiflexion, 40 degrees of plantarflexion, 10 degrees of subtalar motion\par Tenderness: Medial pain. No tenderness over the lateral malleolus, no CFL/ATFL/PTFL pain. No medial malleolus pain, no deltoid ligament pain. No proximal fibular pain. No heel pain.\par Stability: Negative anterior/posterior drawer.\par Strength: 5/5 TA/GS/EHL\par Neuro: In tact to light touch throughout\par Additional tests: Negative Mortons test, Negative syndesmosis squeeze test. [de-identified] : No new imaging was obtained.

## 2022-08-22 NOTE — HISTORY OF PRESENT ILLNESS
[FreeTextEntry1] : 7 months post op. s/p ORIF of right trimalleolar ankle fracture without posterior malleolar fixation\par DOS: 1/25/2022.\par \par 21 year old female presenting in the office approximately 7 months post op from ORIF of right trimalleolar ankle fracture without posterior malleolar fixation. The patient's pain is noted to be controlled. She is wearing flip flops and is walking without assistance. She states that the wound has healed. She has been weightbearing and standing working at [employer]. Swelling is stated to be waxing and waning as per the patient. Current symptoms include no chills, no fever, no nausea and no vomiting. She denies any calf pain. No other complaints.

## 2022-08-22 NOTE — DISCUSSION/SUMMARY
[de-identified] : Today I had a lengthy discussion with the patient regarding their right ankle, 7 months post op from ORIF of right trimalleolar ankle fracture without posterior malleolar fixation. I have addressed all the patient's concerns surrounding the pathology of their condition. Patient deferred x-rays at this time. At this time, the patient may follow up on an as-needed basis for reassessment. The patient understood and verbally agreed to the treatment plan. All of their questions were answered and they were satisfied with the visit. The patient should call the office if they have any questions or experience worsening symptoms.

## 2022-08-23 ENCOUNTER — APPOINTMENT (OUTPATIENT)
Dept: OBGYN | Facility: CLINIC | Age: 22
End: 2022-08-23

## 2022-08-23 VITALS
HEIGHT: 63 IN | SYSTOLIC BLOOD PRESSURE: 118 MMHG | DIASTOLIC BLOOD PRESSURE: 66 MMHG | WEIGHT: 133 LBS | BODY MASS INDEX: 23.57 KG/M2

## 2022-08-23 PROCEDURE — 99213 OFFICE O/P EST LOW 20 MIN: CPT

## 2022-08-23 NOTE — PHYSICAL EXAM
[FreeTextEntry1] : PROMINENT MIDLINE HYMENAL REMNANT EXTENDING FROM 12 TO 6 OCLOCK W EXCORIATIONS IN LOWER PORTION

## 2022-12-01 ENCOUNTER — APPOINTMENT (OUTPATIENT)
Dept: OBGYN | Facility: CLINIC | Age: 22
End: 2022-12-01

## 2022-12-01 DIAGNOSIS — Q52.3 IMPERFORATE HYMEN: ICD-10-CM

## 2022-12-01 PROCEDURE — 99213 OFFICE O/P EST LOW 20 MIN: CPT | Mod: 95

## 2022-12-01 NOTE — HISTORY OF PRESENT ILLNESS
[Other Location: e.g. School (Enter Location, City,State)___] : at [unfilled], at the time of the visit. [Medical Office: ([facility])___] : at the medical office located in  [Verbal consent obtained from patient] : the patient, [unfilled]

## 2023-01-10 ENCOUNTER — APPOINTMENT (OUTPATIENT)
Dept: OBGYN | Facility: CLINIC | Age: 23
End: 2023-01-10

## 2023-01-17 RX ORDER — NORETHINDRONE ACETATE AND ETHINYL ESTRADIOL AND FERROUS FUMARATE 1MG-20(21)
1-20 KIT ORAL DAILY
Qty: 1 | Refills: 5 | Status: ACTIVE | COMMUNITY
Start: 2022-12-01 | End: 1900-01-01

## 2024-01-31 ENCOUNTER — NON-APPOINTMENT (OUTPATIENT)
Age: 24
End: 2024-01-31

## 2024-01-31 ENCOUNTER — APPOINTMENT (OUTPATIENT)
Dept: OBGYN | Facility: CLINIC | Age: 24
End: 2024-01-31

## 2024-01-31 ENCOUNTER — APPOINTMENT (OUTPATIENT)
Dept: ORTHOPEDIC SURGERY | Facility: CLINIC | Age: 24
End: 2024-01-31
Payer: COMMERCIAL

## 2024-01-31 DIAGNOSIS — M76.821 POSTERIOR TIBIAL TENDINITIS, RIGHT LEG: ICD-10-CM

## 2024-01-31 DIAGNOSIS — S82.851A DISPLACED TRIMALLEOLAR FRACTURE OF RIGHT LOWER LEG, INITIAL ENCOUNTER FOR CLOSED FRACTURE: ICD-10-CM

## 2024-01-31 DIAGNOSIS — T84.84XA PAIN DUE TO INTERNAL ORTHOPEDIC PROSTHETIC DEVICES, IMPLANTS AND GRAFTS, INITIAL ENCOUNTER: ICD-10-CM

## 2024-01-31 PROCEDURE — 99214 OFFICE O/P EST MOD 30 MIN: CPT

## 2024-01-31 PROCEDURE — 73610 X-RAY EXAM OF ANKLE: CPT | Mod: RT

## 2024-01-31 NOTE — DISCUSSION/SUMMARY
[de-identified] :  Today I had a lengthy discussion with the patient regarding their right ankle pain.I have addressed all the patient's concerns surrounding the pathology of their condition.  I recommend that the patient utilize ice, NSAIDS PRN, and heat. They can also elevate their right ankle above the level of the heart.  In order to provide the patient with a better understanding of their ailment, I educated them about the anatomy, physiology and lifespan of their condition using a foot model.  We discussed that we will not be looking at any surgical options to treat her pain at this time.   I recommend that the patient utilize Voltaren gel topically. A prescription for the Voltaren gel was ordered for the patient today. If the Voltaren gel could not be obtained, Icy Hot, Biofreeze, or Bengay can be utilized instead.   I recommend the patient undergo a course of physical therapy for the right ankle 2-3 times a week for a total of 8-12 weeks. A prescription was given for the physical therapy today.  The patient understood and verbally agreed to the treatment plan. All of their questions were answered and they were satisfied with the visit. The patient should call the office if they have any questions or experience worsening symptoms.

## 2024-01-31 NOTE — PHYSICAL EXAM
[de-identified] : General: Alert and oriented x3. In no acute distress. Pleasant in nature with a normal affect. No apparent respiratory distress.  +mild swelling Right Ankle Exam Skin: Clean, dry, intact Inspection: No obvious malalignment, mild swelling, no effusion; no lymphadenopathy Pulses: 2+ DP/PT pulses ROM: 10 degrees of dorsiflexion, 40 degrees of plantarflexion, 10 degrees of subtalar motion Tenderness: Medial pain. No tenderness over the lateral malleolus, no CFL/ATFL/PTFL pain. No medial malleolus pain, no deltoid ligament pain. No proximal fibular pain. No heel pain. Stability: Negative anterior/posterior drawer. Strength: 5/5 TA/GS/EHL Neuro: In tact to light touch throughout Additional tests: Negative Mortons test, Negative syndesmosis squeeze test. [de-identified] : 3V of the X were ordered obtained and reviewed by me today, 01/31/2024 , revealed: hardware intact

## 2024-01-31 NOTE — HISTORY OF PRESENT ILLNESS
[FreeTextEntry1] : Patient is a 23 year old, G  P , presenting for c/o   LMP:   GYNHx: Denies abnl pap smears Denies fibroids/endometriosis/cysts Denies STIs   SexualHx:   Contraception:   Occupation:

## 2024-01-31 NOTE — ADDENDUM
[FreeTextEntry1] : I, Naseem Jarvis, acted solely as a scribe for Dr. Naseem San on this date 01/31/2024  .   All medical record entries made by the Scribe were at my, Dr. Naseem San, direction and personally dictated by me on 01/31/2024 . I have reviewed the chart and agree that the record accurately reflects my personal performance of the history, physical exam, assessment and plan. I have also personally directed, reviewed, and agreed with the chart.

## 2024-01-31 NOTE — PLAN
[FreeTextEntry1] : #Vaginal Irritation/Discharge - UA/Urine Culture obtained - GC/CT testing sent - Affirm obtained and sent - STI testing offered/obtained - Discussed importance of continued condom use for STI prevention/pregnancy prevention - Will f/u once labs are resulted

## 2024-01-31 NOTE — HISTORY OF PRESENT ILLNESS
[FreeTextEntry1] :  01/31/2024: GILMAR TREVIZO is a 23 year old female presenting to the office for a follow up evaluation of 12 months post op. s/p ORIF of right trimalleolar ankle fracture without posterior malleolar fixation. DOS: 1/25/2022. 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 She notes that her symptoms have worsened since her last visit since a few weeks ago her ankle started swelling up and turned a black/blue color. The patient presents to the office in sneakers and ambulating without assistance.   21 year old female presenting in the office approximately 7 months post op from ORIF of right trimalleolar ankle fracture without posterior malleolar fixation. The patient's pain is noted to be controlled. She is wearing flip flops and is walking without assistance. She states that the wound has healed. She has been weightbearing and standing working at [employer]. Swelling is stated to be waxing and waning as per the patient. Current symptoms include no chills, no fever, no nausea and no vomiting. She denies any calf pain. No other complaints.

## 2024-02-14 NOTE — ED PEDIATRIC TRIAGE NOTE - BSA (M2)
1.61
If an incision was performed as part of your procedure, contact your doctor with any pain, swelling, redness, or other concerns you may have about that area.

## 2024-02-21 ENCOUNTER — APPOINTMENT (OUTPATIENT)
Dept: OBGYN | Facility: CLINIC | Age: 24
End: 2024-02-21
Payer: COMMERCIAL

## 2024-02-21 VITALS
HEIGHT: 63 IN | WEIGHT: 142 LBS | DIASTOLIC BLOOD PRESSURE: 72 MMHG | BODY MASS INDEX: 25.16 KG/M2 | SYSTOLIC BLOOD PRESSURE: 105 MMHG

## 2024-02-21 LAB
BILIRUB UR QL STRIP: NORMAL
GLUCOSE UR-MCNC: NORMAL
HCG UR QL: 0.2 EU/DL
HGB UR QL STRIP.AUTO: NORMAL
KETONES UR-MCNC: NORMAL
LEUKOCYTE ESTERASE UR QL STRIP: NORMAL
NITRITE UR QL STRIP: NORMAL
PH UR STRIP: 5.5
PROT UR STRIP-MCNC: NORMAL
SP GR UR STRIP: 1

## 2024-02-21 PROCEDURE — 99213 OFFICE O/P EST LOW 20 MIN: CPT

## 2024-02-21 PROCEDURE — 81003 URINALYSIS AUTO W/O SCOPE: CPT | Mod: QW

## 2024-02-21 RX ORDER — NITROFURANTOIN (MONOHYDRATE/MACROCRYSTALS) 25; 75 MG/1; MG/1
100 CAPSULE ORAL
Qty: 30 | Refills: 0 | Status: ACTIVE | COMMUNITY
Start: 2024-02-21 | End: 1900-01-01

## 2024-02-23 LAB — BACTERIA UR CULT: NORMAL

## 2024-04-22 ENCOUNTER — APPOINTMENT (OUTPATIENT)
Dept: UROGYNECOLOGY | Facility: CLINIC | Age: 24
End: 2024-04-22
Payer: COMMERCIAL

## 2024-04-22 VITALS
DIASTOLIC BLOOD PRESSURE: 78 MMHG | BODY MASS INDEX: 25.69 KG/M2 | SYSTOLIC BLOOD PRESSURE: 111 MMHG | WEIGHT: 145 LBS | HEIGHT: 63 IN | HEART RATE: 75 BPM

## 2024-04-22 DIAGNOSIS — N39.0 URINARY TRACT INFECTION, SITE NOT SPECIFIED: ICD-10-CM

## 2024-04-22 PROCEDURE — 99212 OFFICE O/P EST SF 10 MIN: CPT

## 2024-04-22 PROCEDURE — 99459 PELVIC EXAMINATION: CPT

## 2024-04-22 PROCEDURE — 99202 OFFICE O/P NEW SF 15 MIN: CPT

## 2024-04-22 NOTE — HISTORY OF PRESENT ILLNESS
[FreeTextEntry1] : Lacey TREVIZO is a 23-year-old G0 presenting for evaluation of urinary tract infections. 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 She was also noticing infection associated with intercourse. She saw her GYN who started post-coital Macrobid. She used this a few times, however stopped most recently and has been taking [employer] bladder health products which she thinks contains cranberry. She's also being more mindful of voiding more frequently during the day while at work. She reports that since implementing these changes she hasn't had any UTIs (> 2 months since last UTI). She denies any complaints today, no dysuria, hematuria, frequency/urgency. She also denies any issues with bowel movements.

## 2024-04-22 NOTE — DISCUSSION/SUMMARY
[FreeTextEntry1] : #Recurrent UTIs - Hold post-coital antibiotic prophylaxis - Will continue OTC preventative measures - recommend adding D-Mannose and probiotics to cranberry supplementation. Also advised to maintain adequate water intake (2-2.5 liters daily). Avoid holding urine for long periods of time.  - May consider renal imaging if return of recurrent infections despite the above and restarting post-coital prophylaxis.   #Redundant hymenal tissue - Patient will follow-up with Dr. Mead if decided to proceed with hymenectomy, previously desired and scheduled procedure but could not proceed due to scheduling conflicts.   #Imperforate hymen

## 2024-04-22 NOTE — PHYSICAL EXAM
[Chaperone Present] : A chaperone was present in the examining room during all aspects of the physical examination [28026] : A chaperone was present during the pelvic exam. [FreeTextEntry2] : Evelin CASE [FreeTextEntry1] : General: Well, appearing, no acute distress HEENT: Normocephalic, atraumatic Respiratory: Speaking in full sentences comfortably, normal work of breathing and no cough during visit Extremities: No upper extremity edema noted Skin: No obvious rash or skin lesions Neuro: Alert and oriented x 3, speech is fluent, normal rate Psych: Normal mood and affect [Normal] : normal external genitalia [Post Void Residual ____ml] : post void residual was [unfilled] ml [FreeTextEntry4] : Speculum exam not performed, redundant hymenal tissue noted from 12 to 7 o'clock position, patent vaginal opening noted.  [de-identified] : via bladder scanner

## 2024-05-23 ENCOUNTER — NON-APPOINTMENT (OUTPATIENT)
Age: 24
End: 2024-05-23

## 2024-10-04 ENCOUNTER — APPOINTMENT (OUTPATIENT)
Dept: OBGYN | Facility: CLINIC | Age: 24
End: 2024-10-04
Payer: COMMERCIAL

## 2024-10-04 VITALS
DIASTOLIC BLOOD PRESSURE: 70 MMHG | WEIGHT: 140.25 LBS | HEIGHT: 63 IN | SYSTOLIC BLOOD PRESSURE: 114 MMHG | BODY MASS INDEX: 24.85 KG/M2

## 2024-10-04 DIAGNOSIS — Z01.419 ENCOUNTER FOR GYNECOLOGICAL EXAMINATION (GENERAL) (ROUTINE) W/OUT ABNORMAL FINDINGS: ICD-10-CM

## 2024-10-04 DIAGNOSIS — N94.6 DYSMENORRHEA, UNSPECIFIED: ICD-10-CM

## 2024-10-04 PROCEDURE — G0444 DEPRESSION SCREEN ANNUAL: CPT | Mod: 59

## 2024-10-04 PROCEDURE — 99395 PREV VISIT EST AGE 18-39: CPT

## 2024-10-04 RX ORDER — ETONOGESTREL AND ETHINYL ESTRADIOL 11.7; 2.7 MG/1; MG/1
0.12-0.015 INSERT, EXTENDED RELEASE VAGINAL
Qty: 1 | Refills: 11 | Status: ACTIVE | COMMUNITY
Start: 2024-10-04 | End: 1900-01-01

## 2024-10-04 NOTE — PLAN
[FreeTextEntry1] :  Patient screened for depression- no signs of clinical depression.  PHQ-9 scores reviewed over the course of the visit  5-10 minutes of face to face time spent.  Follow up with changes in mood including other symptoms of anxiety. WANTS TO EVENTUALLY HAVE HYMENECTOMY BUT WAITING UNTIL SCHOOL IS FINISHED

## 2024-10-04 NOTE — REASON FOR VISIT
[Annual] : an annual visit. [TextEntry] : ANNUAL EXAM.  NO COMPLAINTS MENSES MONTHLY W WORSENING DYSMENORRHEA.  HAD BEEN ON OCPS FOR SEVERAL MTHS BUT STOPPED DUE TO NAUSEA AND MOOD CHANGES HAD BEEN HAVING FREQUENT UTIS BUT NOW RESOLVED.  VOIDING MORE FREQUENTLY DURING DAY.  USED CRANBERRY BUT NOW STOPPED.  SEXUALLY ACTIVE W SAME PARTNER.  USING CONDOMS

## 2024-10-04 NOTE — PHYSICAL EXAM
[Appropriately responsive] : appropriately responsive [Alert] : alert [No Acute Distress] : no acute distress [No Lymphadenopathy] : no lymphadenopathy [Soft] : soft [Non-tender] : non-tender [Non-distended] : non-distended [No HSM] : No HSM [No Lesions] : no lesions [No Mass] : no mass [Oriented x3] : oriented x3 [Examination Of The Breasts] : a normal appearance [No Masses] : no breast masses were palpable [Labia Majora] : normal [Labia Minora] : normal [Normal] : normal [Uterine Adnexae] : normal [FreeTextEntry4] : LARGE HYEMNAL SEPTUM

## 2024-10-06 LAB
HIV1+2 AB SPEC QL IA.RAPID: NONREACTIVE
T PALLIDUM AB SER QL IA: NEGATIVE

## 2024-10-08 LAB
C TRACH RRNA SPEC QL NAA+PROBE: NOT DETECTED
HPV HIGH+LOW RISK DNA PNL CVX: NOT DETECTED
N GONORRHOEA RRNA SPEC QL NAA+PROBE: NOT DETECTED
SOURCE AMPLIFICATION: NORMAL

## 2024-10-11 LAB — CYTOLOGY CVX/VAG DOC THIN PREP: NORMAL
